# Patient Record
Sex: FEMALE | Race: BLACK OR AFRICAN AMERICAN | NOT HISPANIC OR LATINO | Employment: FULL TIME | URBAN - METROPOLITAN AREA
[De-identification: names, ages, dates, MRNs, and addresses within clinical notes are randomized per-mention and may not be internally consistent; named-entity substitution may affect disease eponyms.]

---

## 2017-06-19 ENCOUNTER — OFFICE VISIT (OUTPATIENT)
Dept: INTERNAL MEDICINE | Facility: CLINIC | Age: 52
End: 2017-06-19
Payer: COMMERCIAL

## 2017-06-19 VITALS
SYSTOLIC BLOOD PRESSURE: 100 MMHG | WEIGHT: 289.69 LBS | HEART RATE: 83 BPM | OXYGEN SATURATION: 97 % | DIASTOLIC BLOOD PRESSURE: 60 MMHG | BODY MASS INDEX: 40.56 KG/M2 | TEMPERATURE: 97 F | HEIGHT: 71 IN

## 2017-06-19 DIAGNOSIS — J01.90 ACUTE SINUSITIS, RECURRENCE NOT SPECIFIED, UNSPECIFIED LOCATION: Primary | ICD-10-CM

## 2017-06-19 PROCEDURE — 99213 OFFICE O/P EST LOW 20 MIN: CPT | Mod: S$GLB,,, | Performed by: PHYSICIAN ASSISTANT

## 2017-06-19 PROCEDURE — 99999 PR PBB SHADOW E&M-EST. PATIENT-LVL III: CPT | Mod: PBBFAC,,, | Performed by: PHYSICIAN ASSISTANT

## 2017-06-19 RX ORDER — DOXYCYCLINE 100 MG/1
100 CAPSULE ORAL 2 TIMES DAILY
Qty: 14 CAPSULE | Refills: 0 | Status: SHIPPED | OUTPATIENT
Start: 2017-06-19 | End: 2017-06-26

## 2017-06-19 NOTE — PROGRESS NOTES
"  Subjective:      Patient ID: Pebbles Gonzalez is a 52 y.o. female.    Chief Complaint: Sore Throat    URI    This is a new problem. Episode onset: 2 weeks. The problem has been gradually worsening. There has been no fever. Associated symptoms include ear pain, headaches, rhinorrhea and a sore throat. Pertinent negatives include no abdominal pain, chest pain, congestion, coughing, diarrhea, dysuria, joint pain, joint swelling, nausea, neck pain, plugged ear sensation, rash, sinus pain, sneezing, swollen glands, vomiting or wheezing. Treatments tried: flonase. The treatment provided no relief.       Review of Systems   Constitutional: Positive for fatigue. Negative for activity change, appetite change, chills, diaphoresis, fever and unexpected weight change.   HENT: Positive for ear pain, postnasal drip, rhinorrhea and sore throat. Negative for congestion, dental problem, sneezing and trouble swallowing.    Eyes: Positive for itching. Negative for photophobia, pain, discharge and redness.   Respiratory: Positive for shortness of breath. Negative for cough, chest tightness and wheezing.    Cardiovascular: Negative for chest pain.   Gastrointestinal: Negative for abdominal pain, diarrhea, nausea and vomiting.   Genitourinary: Negative for dysuria.   Musculoskeletal: Negative for joint pain and neck pain.   Skin: Negative for rash.   Neurological: Positive for headaches.     Objective:   /60   Pulse 83   Temp 96.8 °F (36 °C) (Tympanic)   Ht 5' 11" (1.803 m)   Wt 131.4 kg (289 lb 11 oz)   SpO2 97%   BMI 40.40 kg/m²     Physical Exam   Constitutional: She is oriented to person, place, and time. She appears well-developed and well-nourished.   HENT:   Head: Normocephalic and atraumatic.   Right Ear: Hearing, tympanic membrane, external ear and ear canal normal. No tenderness.   Left Ear: Hearing, tympanic membrane, external ear and ear canal normal. No tenderness.   Nose: Mucosal edema and rhinorrhea " present. No sinus tenderness. Right sinus exhibits no maxillary sinus tenderness and no frontal sinus tenderness. Left sinus exhibits no maxillary sinus tenderness and no frontal sinus tenderness.   Mouth/Throat: Uvula is midline and mucous membranes are normal. No oral lesions. Normal dentition. No dental abscesses, uvula swelling or dental caries. Oropharyngeal exudate and posterior oropharyngeal erythema present. No posterior oropharyngeal edema or tonsillar abscesses. No tonsillar exudate.   Eyes: Conjunctivae and EOM are normal. Pupils are equal, round, and reactive to light. Right eye exhibits no discharge. Left eye exhibits no discharge.   Neck: Normal range of motion. Neck supple. Muscular tenderness present. No no neck rigidity.   Cardiovascular: Normal rate, regular rhythm and normal heart sounds.  Exam reveals no gallop and no friction rub.    No murmur heard.  Pulmonary/Chest: Effort normal and breath sounds normal. No respiratory distress. She has no wheezes. She has no rales.   Lymphadenopathy:     She has no cervical adenopathy.   Neurological: She is alert and oriented to person, place, and time.   Skin: Skin is warm. No rash noted. She is not diaphoretic. No erythema. No pallor.   Psychiatric: She has a normal mood and affect. Her behavior is normal. Judgment and thought content normal.   Nursing note and vitals reviewed.      Assessment:     1. Acute sinusitis, recurrence not specified, unspecified location      Plan:   Acute sinusitis, recurrence not specified, unspecified location  -     doxycycline (MONODOX) 100 MG capsule; Take 1 capsule (100 mg total) by mouth 2 (two) times daily. Do not take with milk or yogurt  Dispense: 14 capsule; Refill: 0    -dayquil/nyquil. Cepacol cough drops.  Gargles  -Educational handout on over-the-counter medications and at-home conservative care, pertinent to the patients diagnosis today, was handed to the patient and discussed in detail.    Return if symptoms  worsen or fail to improve.

## 2017-06-19 NOTE — PATIENT INSTRUCTIONS
-Over-the-counter supportive tx for colds and cough:   -start cepacol max sore throat drops (over-the-counter)  - refrain from smoking, drink plenty of fluids, hot tea with honey, rest, take medications as prescribed, and use a humidifier or steam in the bathroom.   -Shower in the morning and evening to wash away any allergens and help reduce the production of mucus.   -Try OTC saline nasal spray or nedi-pot using warm filtered water.   -Take tylenol or Advil for fever, sore throat, and muscle aches.  -warm salt water gargles or Listerine gargles for sore throat frequently throughout the day.  - Try OTC Mucinex (guafenesin) for cough with thick mucous.   - Zinc lozenge, Emergen-C, or Airborne,  to boost immune system.     -No more than 10 in 24 hours.  -Phenylephrine/pseudophedrine or anything labeled with a D after it is for congestion.   Avoid decongestants if diagnosed with hypertension or arrhythmias. To avoid  rebound congestion, refrain from taking decongestant for longer than 5 day.    -DM is for dextromethorphan. This is a cough suppressant.   -For prevention,extremely important to quit smoking, get annual influenza vaccines, reduce exposure to air pollution, and to frequently wash hands to avoid spread of infection.

## 2017-06-20 ENCOUNTER — PATIENT MESSAGE (OUTPATIENT)
Dept: OBSTETRICS AND GYNECOLOGY | Facility: CLINIC | Age: 52
End: 2017-06-20

## 2018-01-08 ENCOUNTER — OFFICE VISIT (OUTPATIENT)
Dept: INTERNAL MEDICINE | Facility: CLINIC | Age: 53
End: 2018-01-08
Payer: COMMERCIAL

## 2018-01-08 VITALS
SYSTOLIC BLOOD PRESSURE: 106 MMHG | TEMPERATURE: 98 F | WEIGHT: 293 LBS | OXYGEN SATURATION: 98 % | DIASTOLIC BLOOD PRESSURE: 68 MMHG | BODY MASS INDEX: 41.02 KG/M2 | HEIGHT: 71 IN | HEART RATE: 94 BPM

## 2018-01-08 DIAGNOSIS — Z12.31 ENCOUNTER FOR SCREENING MAMMOGRAM FOR MALIGNANT NEOPLASM OF BREAST: ICD-10-CM

## 2018-01-08 DIAGNOSIS — J45.20 MILD INTERMITTENT ASTHMA WITHOUT COMPLICATION: ICD-10-CM

## 2018-01-08 DIAGNOSIS — J32.9 SINUSITIS, UNSPECIFIED CHRONICITY, UNSPECIFIED LOCATION: Primary | ICD-10-CM

## 2018-01-08 PROCEDURE — 99999 PR PBB SHADOW E&M-EST. PATIENT-LVL III: CPT | Mod: PBBFAC,,, | Performed by: FAMILY MEDICINE

## 2018-01-08 PROCEDURE — 99214 OFFICE O/P EST MOD 30 MIN: CPT | Mod: S$GLB,,, | Performed by: FAMILY MEDICINE

## 2018-01-08 RX ORDER — CYCLOSPORINE 0.5 MG/ML
1 EMULSION OPHTHALMIC 2 TIMES DAILY
COMMUNITY
Start: 2017-12-22

## 2018-01-08 RX ORDER — LINACLOTIDE 145 UG/1
1 CAPSULE, GELATIN COATED ORAL DAILY
Refills: 1 | COMMUNITY
Start: 2017-12-30 | End: 2019-03-05 | Stop reason: SDUPTHER

## 2018-01-08 RX ORDER — PANTOPRAZOLE SODIUM 40 MG/1
40 TABLET, DELAYED RELEASE ORAL DAILY
COMMUNITY
Start: 2017-11-28 | End: 2019-03-05 | Stop reason: SDUPTHER

## 2018-01-08 RX ORDER — ALBUTEROL SULFATE 90 UG/1
2 AEROSOL, METERED RESPIRATORY (INHALATION) EVERY 6 HOURS PRN
Qty: 1 EACH | Refills: 11 | Status: SHIPPED | OUTPATIENT
Start: 2018-01-08 | End: 2021-02-24

## 2018-01-08 RX ORDER — FLUORIDE (SODIUM) 1.1 %
PASTE (ML) DENTAL
Refills: 1 | COMMUNITY
Start: 2017-11-16

## 2018-01-08 RX ORDER — FLUCONAZOLE 150 MG/1
150 TABLET ORAL ONCE
Qty: 1 TABLET | Refills: 1 | Status: SHIPPED | OUTPATIENT
Start: 2018-01-08 | End: 2018-01-08

## 2018-01-08 RX ORDER — DOXYCYCLINE 100 MG/1
100 CAPSULE ORAL 2 TIMES DAILY
Qty: 20 CAPSULE | Refills: 0 | Status: SHIPPED | OUTPATIENT
Start: 2018-01-08 | End: 2018-01-18

## 2018-01-08 RX ORDER — LINACLOTIDE 72 UG/1
1 CAPSULE, GELATIN COATED ORAL
COMMUNITY
Start: 2018-01-04 | End: 2018-02-08

## 2018-01-08 RX ORDER — FLUTICASONE PROPIONATE 50 MCG
1 SPRAY, SUSPENSION (ML) NASAL
COMMUNITY

## 2018-01-08 NOTE — PROGRESS NOTES
Subjective:       Patient ID: Pebbles Gonzalez is a 52 y.o. female.    Chief Complaint: Multiple issues see below    HPIone month ago cold sympt gradually resolved but returned w facial pressure, yellow prod cough and occas sob. Hx asthma no need for med long term  gerd on ppi sees gi  Past Medical History:   Diagnosis Date    Allergy     seasonal    Asthma     adult onset    GERD (gastroesophageal reflux disease)     History of mammogram 12/30/2016     Past Surgical History:   Procedure Laterality Date    CHOLECYSTECTOMY      TOTAL ABDOMINAL HYSTERECTOMY  2000     Family History   Problem Relation Age of Onset    Prostate cancer Father      Social History     Social History    Marital status:      Spouse name: N/A    Number of children: N/A    Years of education: N/A     Occupational History     Other     Social History Main Topics    Smoking status: Never Smoker    Smokeless tobacco: Never Used    Alcohol use No    Drug use: No    Sexual activity: Yes     Partners: Male      Comment: hyst     Other Topics Concern    None     Social History Narrative    None       Review of Systems  Cardiovascular: no chest pain  Chest: no shortness of breath  Abd: no abd pain  Remainder review of systems negative    Objective:      Physical Exam   Constitutional: She is oriented to person, place, and time. She appears well-developed and well-nourished. No distress.   HENT:   Head: Atraumatic.   Right Ear: External ear normal.   Left Ear: External ear normal.   Nose: Nose normal.   Mouth/Throat: Oropharynx is clear and moist. No oropharyngeal exudate.   Nl tms   Eyes: Conjunctivae and EOM are normal. Pupils are equal, round, and reactive to light. No scleral icterus.   Neck: Normal range of motion. Neck supple. No thyromegaly present.   Cardiovascular: Normal rate, regular rhythm and normal heart sounds.    No murmur heard.  Pulmonary/Chest: Effort normal and breath sounds normal. No respiratory distress.  She has no wheezes. She has no rales.   Lymphadenopathy:     She has no cervical adenopathy.   Neurological: She is alert and oriented to person, place, and time. No cranial nerve deficit. She exhibits normal muscle tone. Coordination normal.   Skin: Skin is warm. No rash noted. No erythema. No pallor.   Psychiatric: She has a normal mood and affect. Her behavior is normal. Judgment and thought content normal.   Nursing note and vitals reviewed.      Assessment:       1. Sinusitis, unspecified chronicity, unspecified location    2. Encounter for screening mammogram for malignant neoplasm of breast    3. Mild intermittent asthma without complication        Plan:       *f/u for physical  Sinusitis, unspecified chronicity, unspecified location    Encounter for screening mammogram for malignant neoplasm of breast  -     Mammo Digital Screening Bilat with CAD; Future; Expected date: 01/08/2018    Mild intermittent asthma without complication    Other orders  -     albuterol 90 mcg/actuation inhaler; Inhale 2 puffs into the lungs every 6 (six) hours as needed for Wheezing.  Dispense: 1 each; Refill: 11  -     doxycycline (VIBRAMYCIN) 100 MG Cap; Take 1 capsule (100 mg total) by mouth 2 (two) times daily.  Dispense: 20 capsule; Refill: 0  -     fluconazole (DIFLUCAN) 150 MG Tab; Take 1 tablet (150 mg total) by mouth once.  Dispense: 1 tablet; Refill: 1    **

## 2018-02-08 ENCOUNTER — OFFICE VISIT (OUTPATIENT)
Dept: INTERNAL MEDICINE | Facility: CLINIC | Age: 53
End: 2018-02-08
Payer: COMMERCIAL

## 2018-02-08 ENCOUNTER — HOSPITAL ENCOUNTER (OUTPATIENT)
Dept: RADIOLOGY | Facility: HOSPITAL | Age: 53
Discharge: HOME | End: 2018-02-08
Attending: FAMILY MEDICINE
Payer: COMMERCIAL

## 2018-02-08 VITALS
SYSTOLIC BLOOD PRESSURE: 126 MMHG | WEIGHT: 293 LBS | HEIGHT: 70 IN | TEMPERATURE: 97 F | DIASTOLIC BLOOD PRESSURE: 68 MMHG | HEART RATE: 87 BPM | OXYGEN SATURATION: 97 % | BODY MASS INDEX: 41.95 KG/M2

## 2018-02-08 VITALS — WEIGHT: 293 LBS | BODY MASS INDEX: 41.02 KG/M2 | HEIGHT: 71 IN

## 2018-02-08 DIAGNOSIS — Z00.00 ROUTINE HEALTH MAINTENANCE: Primary | ICD-10-CM

## 2018-02-08 DIAGNOSIS — Z12.31 ENCOUNTER FOR SCREENING MAMMOGRAM FOR MALIGNANT NEOPLASM OF BREAST: ICD-10-CM

## 2018-02-08 PROCEDURE — 99396 PREV VISIT EST AGE 40-64: CPT | Mod: 25,S$GLB,, | Performed by: FAMILY MEDICINE

## 2018-02-08 PROCEDURE — 90686 IIV4 VACC NO PRSV 0.5 ML IM: CPT | Mod: S$GLB,,, | Performed by: FAMILY MEDICINE

## 2018-02-08 PROCEDURE — 90732 PPSV23 VACC 2 YRS+ SUBQ/IM: CPT | Mod: S$GLB,,, | Performed by: FAMILY MEDICINE

## 2018-02-08 PROCEDURE — 99214 OFFICE O/P EST MOD 30 MIN: CPT | Mod: PO | Performed by: FAMILY MEDICINE

## 2018-02-08 PROCEDURE — 90472 IMMUNIZATION ADMIN EACH ADD: CPT | Mod: S$GLB,,, | Performed by: FAMILY MEDICINE

## 2018-02-08 PROCEDURE — 99999 PR PBB SHADOW E&M-EST. PATIENT-LVL IV: CPT | Mod: PBBFAC,,, | Performed by: FAMILY MEDICINE

## 2018-02-08 PROCEDURE — 90471 IMMUNIZATION ADMIN: CPT | Mod: S$GLB,,, | Performed by: FAMILY MEDICINE

## 2018-02-08 PROCEDURE — 77067 SCR MAMMO BI INCL CAD: CPT | Mod: 26,,, | Performed by: RADIOLOGY

## 2018-02-08 PROCEDURE — 77067 SCR MAMMO BI INCL CAD: CPT | Mod: TC,PO

## 2018-02-08 NOTE — PROGRESS NOTES
Subjective:       Patient ID: Pebbles Gonzalez is a 52 y.o. female.    Chief Complaint: here for physical examination and issues  below    HPIprev uri resolved  GERD asympt. Tolerating medication. No swallowing problems  Morbid obesity : we discussed need for  weight loss via health diet/portion control and if able then  Exercise;she is interestd in what sounds like obera with same dr who did stretta for reflux in past. She is working on exerc and health eating/whole 30 diet planned  Asthma asympt    Past Medical History:   Diagnosis Date    Allergy     seasonal    Asthma     adult onset    GERD (gastroesophageal reflux disease)     History of mammogram 12/30/2016     Past Surgical History:   Procedure Laterality Date    CHOLECYSTECTOMY      stretta      TOTAL ABDOMINAL HYSTERECTOMY  2000     Family History   Problem Relation Age of Onset    Prostate cancer Father     Ovarian cancer Maternal Aunt     Ovarian cancer Maternal Aunt     Ovarian cancer Maternal Aunt     Ovarian cancer Maternal Aunt      Social History     Social History    Marital status:      Spouse name: N/A    Number of children: N/A    Years of education: N/A     Occupational History     Other     Social History Main Topics    Smoking status: Never Smoker    Smokeless tobacco: Never Used    Alcohol use No    Drug use: No    Sexual activity: Yes     Partners: Male      Comment: hyst     Other Topics Concern    None     Social History Narrative    None         Review of Systems  Cardiovascular: no chest pain  Chest: no shortness of breath  Abd: no abd pain  Remainder review of systems negative    Objective:      Physical Exam   Constitutional: She is oriented to person, place, and time. She appears well-developed and well-nourished. No distress.   HENT:   Head: Atraumatic.   Right Ear: External ear normal.   Left Ear: External ear normal.   Nose: Nose normal.   Mouth/Throat: Oropharynx is clear and moist. No  oropharyngeal exudate.   bilat tms nl   Eyes: Conjunctivae and EOM are normal. Pupils are equal, round, and reactive to light. No scleral icterus.   Neck: Normal range of motion. Neck supple. No thyromegaly present.   Cardiovascular: Normal rate, regular rhythm and normal heart sounds.    No murmur heard.  Pulmonary/Chest: Effort normal and breath sounds normal. No respiratory distress. She has no wheezes. She has no rales.   Abdominal: Soft. Bowel sounds are normal. She exhibits no distension and no mass. There is no hepatosplenomegaly. There is no tenderness. There is no rebound and no guarding.   Musculoskeletal: Normal range of motion. She exhibits no edema or tenderness.   Lymphadenopathy:     She has no cervical adenopathy.   Neurological: She is alert and oriented to person, place, and time. No cranial nerve deficit. She exhibits normal muscle tone. Coordination normal.   Skin: Skin is warm. No rash noted. No erythema. No pallor.   Psychiatric: She has a normal mood and affect. Her behavior is normal. Judgment and thought content normal.   Nursing note and vitals reviewed.      Assessment:       1. Routine health maintenance      morbid obesity  asthma  Plan:       *fasting lab [place]  Lab 12 months and follow up after  Routine health maintenance  -     Basic metabolic panel; Future; Expected date: 02/08/2018  -     Lipid panel; Future; Expected date: 02/08/2018  -     Basic metabolic panel; Future; Expected date: 02/08/2019  -     Lipid panel; Future; Expected date: 02/08/2019    Other orders  -     (In Office Administered) Pneumococcal Polysaccharide Vaccine (23 Valent) (SQ/IM)    **  Two vaccines from a pharmacy when ready: tetanus/whooping cough vaccine and   Shingrix new shingles vaccine  via a pharmacy

## 2018-02-08 NOTE — PATIENT INSTRUCTIONS
Two vaccines from a pharmacy when ready: tetanus/whooping cough vaccine and   Shingrix new shingles vaccine  via a pharmacy

## 2019-01-14 ENCOUNTER — TELEPHONE (OUTPATIENT)
Dept: ORTHOPEDICS | Facility: CLINIC | Age: 54
End: 2019-01-14

## 2019-01-14 NOTE — TELEPHONE ENCOUNTER
Called the patient about their appointment on 1/15/19 at 8:00 with Nichole Myers. Informed the patient that we would have to reschedule their appointment for another day due to the fact that she was scheduled incorrectly. Patient asked if we had any openings today informed them that we did not have any openings today because we are still moving to the new location. Patient asked if we had anything at the [place] location. I informed them that the [place] location didn't have any openings today. Patient states that they would like to cancel this appointment and will go somewhere else. verbalized understanding.FP

## 2019-02-07 ENCOUNTER — HOSPITAL ENCOUNTER (OUTPATIENT)
Dept: RADIOLOGY | Facility: HOSPITAL | Age: 54
Discharge: HOME | End: 2019-02-07
Attending: PHYSICIAN ASSISTANT
Payer: COMMERCIAL

## 2019-02-07 ENCOUNTER — CLINICAL SUPPORT (OUTPATIENT)
Dept: CARDIOLOGY | Facility: CLINIC | Age: 54
End: 2019-02-07
Payer: COMMERCIAL

## 2019-02-07 ENCOUNTER — OFFICE VISIT (OUTPATIENT)
Dept: INTERNAL MEDICINE | Facility: CLINIC | Age: 54
End: 2019-02-07
Payer: COMMERCIAL

## 2019-02-07 VITALS
TEMPERATURE: 98 F | HEIGHT: 70 IN | DIASTOLIC BLOOD PRESSURE: 82 MMHG | BODY MASS INDEX: 41.95 KG/M2 | HEART RATE: 76 BPM | OXYGEN SATURATION: 96 % | WEIGHT: 293 LBS | SYSTOLIC BLOOD PRESSURE: 118 MMHG

## 2019-02-07 DIAGNOSIS — Z01.818 PREOP EXAM FOR INTERNAL MEDICINE: ICD-10-CM

## 2019-02-07 DIAGNOSIS — R82.90 ABNORMAL URINALYSIS: ICD-10-CM

## 2019-02-07 DIAGNOSIS — J45.20 MILD INTERMITTENT ASTHMA WITHOUT COMPLICATION: ICD-10-CM

## 2019-02-07 DIAGNOSIS — Z01.818 PREOP EXAM FOR INTERNAL MEDICINE: Primary | ICD-10-CM

## 2019-02-07 DIAGNOSIS — S83.241A ACUTE MEDIAL MENISCUS TEAR OF RIGHT KNEE, INITIAL ENCOUNTER: ICD-10-CM

## 2019-02-07 DIAGNOSIS — S83.241A ACUTE MEDIAL MENISCUS TEAR OF RIGHT KNEE, INITIAL ENCOUNTER: Primary | ICD-10-CM

## 2019-02-07 DIAGNOSIS — Z29.9 PREVENTIVE MEASURE: ICD-10-CM

## 2019-02-07 PROCEDURE — 93000 ELECTROCARDIOGRAM COMPLETE: CPT | Mod: S$GLB,,, | Performed by: INTERNAL MEDICINE

## 2019-02-07 PROCEDURE — 99214 PR OFFICE/OUTPT VISIT, EST, LEVL IV, 30-39 MIN: ICD-10-PCS | Mod: S$GLB,,, | Performed by: PHYSICIAN ASSISTANT

## 2019-02-07 PROCEDURE — 71046 X-RAY EXAM CHEST 2 VIEWS: CPT | Mod: TC

## 2019-02-07 PROCEDURE — 93000 EKG 12-LEAD: ICD-10-PCS | Mod: S$GLB,,, | Performed by: INTERNAL MEDICINE

## 2019-02-07 PROCEDURE — 3008F BODY MASS INDEX DOCD: CPT | Mod: CPTII,S$GLB,, | Performed by: PHYSICIAN ASSISTANT

## 2019-02-07 PROCEDURE — 71046 X-RAY EXAM CHEST 2 VIEWS: CPT | Mod: 26,,, | Performed by: RADIOLOGY

## 2019-02-07 PROCEDURE — 99214 OFFICE O/P EST MOD 30 MIN: CPT | Mod: S$GLB,,, | Performed by: PHYSICIAN ASSISTANT

## 2019-02-07 PROCEDURE — 99999 PR PBB SHADOW E&M-EST. PATIENT-LVL III: CPT | Mod: PBBFAC,,, | Performed by: PHYSICIAN ASSISTANT

## 2019-02-07 PROCEDURE — 3008F PR BODY MASS INDEX (BMI) DOCUMENTED: ICD-10-PCS | Mod: CPTII,S$GLB,, | Performed by: PHYSICIAN ASSISTANT

## 2019-02-07 PROCEDURE — 99999 PR PBB SHADOW E&M-EST. PATIENT-LVL III: ICD-10-PCS | Mod: PBBFAC,,, | Performed by: PHYSICIAN ASSISTANT

## 2019-02-07 PROCEDURE — 71046 XR CHEST PA AND LATERAL: ICD-10-PCS | Mod: 26,,, | Performed by: RADIOLOGY

## 2019-02-07 NOTE — PROGRESS NOTES
"Subjective:       Patient ID: Pebbles Gonzalez is a 53 y.o. female.    Chief Complaint: Pre-op Exam    53 year old female presents to clinic for pre-op exam. She reports being scheduled for R knee meniscus repair by Dr. Connell ([facility]) 2/12/19. She reports feeling well today and reports no fever, chills, sxs of infection, CP, SOB, exertional sxs, or other medical complaints. She reports having itching with anesthesia.     PCP: Dr. Adorno    Patient Active Problem List:     Mild intermittent asthma without complication      Review of Systems   Constitutional: Negative for chills and fever.   HENT: Negative for congestion, rhinorrhea and sore throat.    Respiratory: Negative for cough and shortness of breath.    Cardiovascular: Negative for chest pain, palpitations and leg swelling.   Gastrointestinal: Negative for abdominal pain, diarrhea, nausea and vomiting.   Genitourinary: Negative for dysuria, frequency, hematuria and urgency.   Skin: Negative for rash.   Neurological: Negative for dizziness, weakness, numbness and headaches.   Psychiatric/Behavioral: Negative for confusion.       Objective:       Vitals:    02/07/19 0854   BP: 118/82   BP Location: Right arm   Patient Position: Sitting   BP Method: Large (Manual)   Pulse: 76   Temp: 97.6 °F (36.4 °C)   TempSrc: Tympanic   SpO2: 96%   Weight: (!) 136.2 kg (300 lb 4.3 oz)   Height: 5' 10" (1.778 m)     Physical Exam   Constitutional: She is oriented to person, place, and time. She appears well-developed and well-nourished. No distress.   HENT:   Head: Normocephalic and atraumatic.   Mouth/Throat: Oropharynx is clear and moist. No oropharyngeal exudate.   Eyes: EOM are normal. No scleral icterus.   Neck: Neck supple.   Cardiovascular: Normal rate and regular rhythm.   Pulmonary/Chest: Effort normal and breath sounds normal. No stridor. No respiratory distress. She has no decreased breath sounds. She has no wheezes. She has no rhonchi. She has no " rales.   Musculoskeletal: Normal range of motion. She exhibits no edema.   Neurological: She is alert and oriented to person, place, and time. No cranial nerve deficit.   Skin: Skin is warm and dry. No rash noted.   Psychiatric: She has a normal mood and affect. Her speech is normal and behavior is normal. Thought content normal.       Assessment:       1. Acute medial meniscus tear of right knee, initial encounter    2. Preop exam for internal medicine    3. Preventive measure    4. Mild intermittent asthma without complication        Plan:         Pebbles was seen today for pre-op exam.    Diagnoses and all orders for this visit:    Acute medial meniscus tear of right knee, initial encounter; Preop exam for internal medicine  -     CBC auto differential; Future  -     Comprehensive metabolic panel; Future  -     Protime-INR; Future  -     APTT; Future  -     Urinalysis; Future  -     Urinalysis Microscopic; Future  -     X-Ray Chest PA And Lateral; Future  -     EKG 12-lead; Future  Pre-op testing (as above) today with result review following. Will complete pre-op clearance after review of pending results. Pt to inform surgeon of any past adverse reactions to anesthesia.    Preventive measure  -     Lipid panel; Future  Pt reports missing her lipid labs ordered by PCP - will include lipid panel in labs today.    Mild intermittent asthma without complication  Currently asymptomatic.    F/u with PCP Dr. Adorno as recommended for health management.    Addendum 2/11/19:  Pre-op test results reviewed (as below). aPTT slightly elevated and microscopic hematuria present - will make note to surgeon. Pt needs to f/u with PCP for evaluation of hematuria and abnormal aPTT. Pt is otherwise cleared for upcoming surgery from general medical standpoint.   Component      Latest Ref Rng & Units 2/7/2019   WBC      3.90 - 12.70 K/uL 7.64   RBC      4.00 - 5.40 M/uL 4.65   Hemoglobin      12.0 - 16.0 g/dL 13.1   Hematocrit      37.0 -  48.5 % 42.2   MCV      82 - 98 fL 91   MCH      27.0 - 31.0 pg 28.2   MCHC      32.0 - 36.0 g/dL 31.0 (L)   RDW      11.5 - 14.5 % 16.1 (H)   Platelets      150 - 350 K/uL 403 (H)   MPV      9.2 - 12.9 fL 10.8   Immature Granulocytes      0.0 - 0.5 % 0.3   Gran # (ANC)      1.8 - 7.7 K/uL 3.5   Immature Grans (Abs)      0.00 - 0.04 K/uL 0.02   Lymph #      1.0 - 4.8 K/uL 3.4   Mono #      0.3 - 1.0 K/uL 0.5   Eos #      0.0 - 0.5 K/uL 0.2   Baso #      0.00 - 0.20 K/uL 0.05   nRBC      0 /100 WBC 0   Gran%      38.0 - 73.0 % 45.1   Lymph%      18.0 - 48.0 % 44.4   Mono%      4.0 - 15.0 % 6.9   Eosinophil%      0.0 - 8.0 % 2.6   Basophil%      0.0 - 1.9 % 0.7   Differential Method       Automated   Sodium      136 - 145 mmol/L 142   Potassium      3.5 - 5.1 mmol/L 3.9   Chloride      95 - 110 mmol/L 107   CO2      23 - 29 mmol/L 25   Glucose      70 - 110 mg/dL 92   BUN, Bld      6 - 20 mg/dL 8   Creatinine      0.5 - 1.4 mg/dL 0.8   Calcium      8.7 - 10.5 mg/dL 9.4   Total Protein      6.0 - 8.4 g/dL 7.8   Albumin      3.5 - 5.2 g/dL 3.5   Total Bilirubin      0.1 - 1.0 mg/dL 0.4   Alkaline Phosphatase      55 - 135 U/L 126   AST      10 - 40 U/L 15   ALT      10 - 44 U/L 12   Anion Gap      8 - 16 mmol/L 10   eGFR if African American      >60 mL/min/1.73 m:2 >60.0   eGFR if non African American      >60 mL/min/1.73 m:2 >60.0   Specimen UA       Urine, Clean Catch   Color, UA      Yellow, Straw, Susannah Yellow   Appearance, UA      Clear Hazy (A)   pH, UA      5.0 - 8.0 7.0   Specific Gravity, UA      1.005 - 1.030 1.020   Protein, UA      Negative Negative   Glucose, UA      Negative Negative   Ketones, UA      Negative Negative   Bilirubin (UA)      Negative Negative   Occult Blood UA      Negative Trace (A)   Nitrite, UA      Negative Negative   Leukocytes, UA      Negative Negative   Cholesterol      120 - 199 mg/dL 222 (H)   Triglycerides      30 - 150 mg/dL 107   HDL      40 - 75 mg/dL 48   LDL Cholesterol       "63.0 - 159.0 mg/dL 152.6   HDL/Chol Ratio      20.0 - 50.0 % 21.6   Total Cholesterol/HDL Ratio      2.0 - 5.0 4.6   Non-HDL Cholesterol      mg/dL 174   RBC, UA      0 - 4 /hpf 8 (H)   Bacteria, UA      None-Occ /hpf Few (A)   Squam Epithel, UA      /hpf 25   Microscopic Comment       SEE COMMENT   Protime      9.0 - 12.5 sec 10.9   Coumadin Monitoring INR      0.8 - 1.2 1.0   aPTT      21.0 - 32.0 sec 32.8 (H)     CXR 2/7/19: "EXAMINATION: XR CHEST PA AND LATERAL CLINICAL HISTORY: Encounter for other preprocedural examination TECHNIQUE: PA and lateral views of the chest were performed. COMPARISON: None FINDINGS: Cardiac silhouette and mediastinal contours are normal.  Lungs are clear. Osseous structures are intact. IMPRESSION: No acute cardiopulmonary process."    EKG 2/7/19: "Normal sinus rhythm. Normal ECG. No previous ECGs available. Confirmed by SUMI HARLEY MD ([id number]) on 2/7/2019 6:54:09 PM"    "

## 2019-03-05 ENCOUNTER — OFFICE VISIT (OUTPATIENT)
Dept: INTERNAL MEDICINE | Facility: CLINIC | Age: 54
End: 2019-03-05
Payer: COMMERCIAL

## 2019-03-05 VITALS
TEMPERATURE: 97 F | WEIGHT: 293 LBS | SYSTOLIC BLOOD PRESSURE: 116 MMHG | OXYGEN SATURATION: 97 % | BODY MASS INDEX: 41.02 KG/M2 | HEIGHT: 71 IN | HEART RATE: 81 BPM | DIASTOLIC BLOOD PRESSURE: 66 MMHG

## 2019-03-05 DIAGNOSIS — Z00.00 ROUTINE HEALTH MAINTENANCE: Primary | ICD-10-CM

## 2019-03-05 DIAGNOSIS — R31.9 HEMATURIA, UNSPECIFIED TYPE: ICD-10-CM

## 2019-03-05 DIAGNOSIS — E66.01 MORBID OBESITY: ICD-10-CM

## 2019-03-05 DIAGNOSIS — Z12.31 ENCOUNTER FOR SCREENING MAMMOGRAM FOR MALIGNANT NEOPLASM OF BREAST: ICD-10-CM

## 2019-03-05 PROCEDURE — 99999 PR PBB SHADOW E&M-EST. PATIENT-LVL IV: ICD-10-PCS | Mod: PBBFAC,,, | Performed by: FAMILY MEDICINE

## 2019-03-05 PROCEDURE — 99396 PREV VISIT EST AGE 40-64: CPT | Mod: S$GLB,,, | Performed by: FAMILY MEDICINE

## 2019-03-05 PROCEDURE — 99999 PR PBB SHADOW E&M-EST. PATIENT-LVL IV: CPT | Mod: PBBFAC,,, | Performed by: FAMILY MEDICINE

## 2019-03-05 PROCEDURE — 99396 PR PREVENTIVE VISIT,EST,40-64: ICD-10-PCS | Mod: S$GLB,,, | Performed by: FAMILY MEDICINE

## 2019-03-05 RX ORDER — ASPIRIN 325 MG
1 TABLET ORAL 2 TIMES DAILY
Refills: 0 | COMMUNITY
Start: 2019-02-12 | End: 2021-02-24

## 2019-03-05 RX ORDER — PANTOPRAZOLE SODIUM 40 MG/1
40 TABLET, DELAYED RELEASE ORAL DAILY
Qty: 90 TABLET | Refills: 3 | Status: SHIPPED | OUTPATIENT
Start: 2019-03-05 | End: 2020-04-13

## 2019-03-05 RX ORDER — LINACLOTIDE 145 UG/1
145 CAPSULE, GELATIN COATED ORAL DAILY
Qty: 90 CAPSULE | Refills: 3 | Status: SHIPPED | OUTPATIENT
Start: 2019-03-05 | End: 2020-04-13

## 2019-03-05 RX ORDER — OXYCODONE AND ACETAMINOPHEN 5; 325 MG/1; MG/1
1 TABLET ORAL
COMMUNITY
Start: 2019-02-12 | End: 2021-02-24

## 2019-03-05 NOTE — PATIENT INSTRUCTIONS
Lifestyle Changes to Control Cholesterol  You can control your cholesterol through diet, exercise, weight management, quitting smoking, stress management, and taking your medicines right. These things can also lower your risk for cardiovascular disease.    Eating healthy  Your healthcare provider will give you information on diet changes you may need to make. Your provider may recommend that you see a registered dietitian for help with diet changes. Changes may include:  · Cutting back on the amount of fat and cholesterol in your meals  · Eating less salt (sodium). This is especially important if you have high blood pressure.  · Eating more fresh vegetables and fruits  · Eating lean proteins such as fish, poultry, beans, and peas  · Eating less red meat and processed meats  · Using low-fat dairy products  · Using vegetable and nut oils in limited amounts  · Limiting how many sweets and processed foods like chips, cookies, and baked goods that you eat   · Limiting how many sugar-sweetened beverages you drink  · Limiting how often you eat out  Getting exercise  Regular exercise is a good way to help your body control cholesterol. Regular exercise can help in many ways. It can:  · Raise your good cholesterol  · Help lower your bad cholesterol  · Let blood flow better through your body  · Give more oxygen to your muscles and tissues  · Help you manage your weight  · Help your heart pump better  · Lower your blood pressure  Your healthcare provider may recommend that you get more physical activity if you haven't been active. Your provider may recommend that you get moderate to vigorous physical activity for at least 40 minutes each day. You should do this for at least 3 to 4 days each week. A few examples of moderate to vigorous activity are:  · Walking at a brisk pace. This is about 3 to 4 miles per hour.  · Jogging or running  · Swimming or water aerobics  · Hiking  · Dancing  · Martial arts  · Tennis  · Riding a  bicycle or stationary bike  · Dancing  Managing your weight  If you are overweight or obese, your healthcare provider will work with you to help you lose weight and lower your BMI (body mass index). Making diet changes and getting more physical activity can help. Changing your diet will help you lose weight more easily than adding exercise.  Quitting smoking  Smoking and other tobacco use can raise cholesterol and make it harder to control. Quitting is tough. But millions of people have given up tobacco for good. You can quit, too! Think about some of the reasons below to quit smoking. Do any of them make you think twice about your smoking habit?  Stop smoking because it:  · Keeps your cholesterol high, even if you make all the other changes youre supposed to  · Damages your body. It especially harms your heart, lungs, skin, and blood vessels.  · Makes you more likely to have a heart attack (acute myocardial infarction), stroke, or cancer  · Stains your teeth  · Makes your skin, clothes, and breath smell bad  · Costs a lot of money  Controlling stress   Learn ways to control stress. This will help you deal with stress in your home and work life. Controlling stress can greatly lower your risk of getting cardiovascular disease.  Making the most of medicines  Healthy eating and exercise are a good start to keeping your cholesterol down. But you may need some extra help from medicine. If your doctor prescribes medicine, be sure to take it exactly as directed. Remember:  · Tell your healthcare provider about all other medicines you take. This includes vitamins and herbs.  · Tell your healthcare provider if you have any side effects after starting to take a medicine. Examples of side effects to watch for include muscle aches, weakness, blurred vision, rust-colored urine, yellowing of eyes or skin (jaundice), and headache.  · Dont skip a dose or stop taking your medicine because you feel better or because  your cholesterol numbers go down. Never stop taking your medicine unless your healthcare provider has told you its OK.  · Ask your healthcare provider if you have any questions about your medicines.  High risk groups  Some people may need to take medicines called statins to control their cholesterol. This is in addition to eating a healthy diet and getting regular exercise.  Statins can help you stay healthy. They can also help prevent a heart attack or stroke. You may need to take a statin if you are in one of these groups:  · Adults who have had a heart attack or stroke. Or adults who have had peripheral vascular disease, a ministroke (transient ischemic attack), or stable or unstable angina. This group also includes people who have had a procedure to restore blood flow through a blocked artery. These procedures include percutaneous coronary intervention, angioplasty, stent, and open-heart bypass surgery.  · Adults who have diabetes. Or adults who are at higher risk of having a heart attack or stroke and have an LDL cholesterol level of 70 to 189 mg/dL  · Adults who are 21 years old or older and have an LDL cholesterol level of 190 mg/dL or higher.  If you are in a high-risk group, talk with your healthcare provider about your treatment goals. Make sure you understand why these goals are important, based on your own health history and your family history of heart disease or high cholesterol.  Make a plan to have regular cholesterol checks. You may need to fast before getting this test. Also ask your provider about any side effects your medicines may cause. Let your provider know about any side effects you have. You may need to take more than one medicine to reach the cholesterol goals that you and your provider decide on.  Date Last Reviewed: 10/1/2016  © 0482-1019 [employer]. [address] [place] [address], [place] [address]. All rights reserved. This information is not intended as a substitute for  professional medical care. Always follow your healthcare professional's instructions.        Cholesterol  Does this test have other names?  Total blood cholesterol, serum cholesterol  What is this test?  This test measures the amount of cholesterol in your blood. This helps your healthcare provider figure out your risk for heart disease.  Cholesterol is a substance found in all of your body's cells, where it plays an important role. But your body can have too much cholesterol if you eat the wrong types of foods. These include fried foods and foods with saturated or trans fats. Some health conditions can also make your cholesterol level too high.  If you have too much cholesterol in your blood, it can stick to the walls of the arteries in your heart. This can cause heart disease. Cholesterol can also stick to the walls of arteries elsewhere in your body. This can cause other artery diseases. The extra cholesterol can make your blood vessels narrower (atherosclerosis). This narrowing makes it harder to get enough blood through your blood vessels. If your heart muscles don't get enough blood, you may be at risk for a heart attack.  The American Heart Association recommends that all adults ages 20 and older have their cholesterol checked every 4 to 6 years.  Why do I need this test?  You may have this test as part of your regular health checkup. You may have this test done more often if you are at risk for heart disease or have other health problems caused by high cholesterol.  Here are some common reasons for the test:  · You have risk factors for heart disease. These include older age, obesity, family history of heart disease, high blood pressure, smoking, and diabetes.  · You have a condition that may be closely linked to high cholesterol. This includes diabetes, alcoholism, and thyroid, liver, or kidney disease.  · You eat a diet high in cholesterol and fats.  You may also have this test if you had high or  "borderline cholesterol on an earlier blood test. Your healthcare provider may want to check to see whether medicine, diet, exercise, and other lifestyle changes are helping lower your cholesterol.  What other tests might I have along with this test?  Your healthcare provider may also order other blood tests to find out your HDL ("good") cholesterol, LDL ("bad") cholesterol, and triglyceride levels. This combination of blood tests is called a lipoprotein profile or a lipid profile.  What do my test results mean?  Many things may affect your lab test results. These include the method each lab uses to do the test. Even if your test results are different from the normal value, you may not have a problem. To learn what the results mean for you, talk with your healthcare provider.  Total cholesterol is measured in milligrams per deciliter (mg/dL). This is what your cholesterol number may mean:  · Less than 200 mg/dL is a good number. Your risk of heart disease may be low.  · 200 mg/dL to 239 mg/dL is borderline high. You may be at some risk for heart disease.  · 240 mg/dL or higher means your cholesterol is high. Your risk for heart disease may be higher than that of a person with normal cholesterol.  Keep in mind that your risk for heart disease based on your total cholesterol greatly depends on your HDL and LDL cholesterol levels and other risk factors.  High cholesterol may be linked to these conditions:  · Inherited diseases that cause high cholesterol  · Gallbladder stones  · Kidney failure  · Cancer of the pancreas or prostate  · Low thyroid hormone  · Diabetes  · Obesity  Cholesterol levels below 140 mg/dL may happen with:  · Very healthy lifestyle and diet  · Severe liver disease  · High thyroid hormone  · Severe burns  · White blood cell cancers  · Poor nutrition  · Some types of anemia  · Short-term illness or infection  · Chronic lung disease  How is this test done?  The test requires a blood sample, which is " drawn through a needle from a vein in your arm.  Does this test pose any risks?  Taking a blood sample with a needle carries risks that include bleeding, infection, bruising, or feeling dizzy. When the needle pricks your arm, you may feel a slight stinging sensation or pain. Afterward, the site may be slightly sore.  What might affect my test results?  Your diet, age, alcohol use, and other lifestyle choices may affect your results. Many medicines may also affect your results. Pregnancy may also affect your results. Having a heart attack in the last 3 months will also affect your results.  How do I get ready for this test?  You should keep to your regular diet and avoid alcohol for at least 2 days before this test. You will be asked to not eat or drink anything but water for a certain amount of time before the test. This test is usually done in the morning after you fast overnight. If you take medicines in the morning, ask your healthcare provider whether you should take your pills.  In addition, be sure your healthcare provider knows about all medicines, herbs, vitamins, and supplements you are taking. This includes medicines that don't need a prescription and any illicit drugs you may use.  © 0935-2134 [employer]. [address] [place] [address], [place] [address]. All rights reserved. This information is not intended as a substitute for professional medical care. Always follow your healthcare professional's instructions.        Understanding Fat and Cholesterol  Too much cholesterol in your blood can lead to many problems such as blocked arteries. This can lead to heart attack and stroke. One of the best ways to manage heart and blood vessel disease is to lower your blood cholesterol. Planning meals that are low in saturated fat and cholesterol helps reduce the level of cholesterol in your blood. Below are eating tips to help lower your blood cholesterol levels.  Eat less fat  A healthy goal is to have less  than 25% to 35% of your daily calories come from fat. Instead of fats, eat more fruits, whole-grains, and vegetables. This also helps control your weight, and can even reduce your risk for some cancers. There are different kinds of fats in foods. Fats can be saturated, unsaturated, or trans fats. The best fats to choose are unsaturated fats. But fats are high in calories, so eat even unsaturated fats sparingly.  Limit foods high in saturated fats  Saturated fats come from animals and certain plants (such as coconut and palm). Eating too much saturated fat can raise your blood cholesterol levels and make your artery problems worse. Your goal is to eat less saturated fat. Below are some examples of foods that contain lots of saturated fat:  · Fatty cuts of meat (lamb, ham, beef)  · Many pastries, cakes, cookies, and candies  · Cream, ice cream, sour cream, cheese, and butter, and foods made with them  · Sauces made with butter or cream  · Salad dressings with saturated fats  · Foods that contain palm or coconut oil  Choose unsaturated fats  Unsaturated fats are usually liquid at room temperature. They are better choices for your heart than saturated fat. There are two types of unsaturated fats: polyunsaturated fat and monounsaturated fat. Aim to replace saturated fats with polyunsaturated or monounsaturated fats.  · Polyunsaturated fats are found in corn oil, safflower oil, sunflower oil, and other vegetable oils.  · Monounsaturated fats are found in olive oil, canola oil, and peanut oil. Some margarines and spreads are now made with these oils, too. Avocados are also high in monounsaturated fat.  Of all fats, monounsaturated fats are the least harmful to your heart.  Avoid trans fats  Like saturated fats, trans fats have been linked to heart disease. Even a small amount can harm your health. Trans fats are found in liquid oils that have been changed to be solid at room temperature. Margarine, which is often made from  vegetable oil, is one example. Vegetable shortening is another. Trans fats are often found in packaged goods. Check ingredients for the words hydrogenated or partially hydrogenated. They mean the foods contain trans fat.  What about triglycerides?  Triglycerides are a type of fat in your blood. Like cholesterol, high levels of triglycerides can lead to blocked arteries. High triglyceride levels can be reduced 20% to 50%  by limiting added sugars in your diet, susbstituting healthier fats for saturated and trans fats, getting more physical activity, and losing weight if your are overweight. You may also be advised to avoid or limi alcohol.    Reading food labels  Luckily, most foods now have labels giving you the facts about what youre eating. Reading food labels helps you make healthy choices. Look for the words highlighted below.  · Serving Size. This is the amount of food in 1 serving. If you eat larger portions, be sure to count more of everything: fat, calories, and cholesterol.  · Total Fat. Tells you how many grams (g) of fat are in 1 serving.  · Calories from Fat. This tells you the total number of calories from fat in 1 serving (there are 9 calories per gram of fat). Look for foods with the fewest calories from fat.  · Saturated Fat. Tells you how many grams (g) of saturated fat are in 1 serving.  · Trans Fat. Tells how many grams (g) of trans fat are in 1 serving.  · Cholesterol. Tells you how many milligrams (mg) of cholesterol are in 1 serving.  Date Last Reviewed: 5/11/2015  © 4457-0914 The "[facility] [employer]. [address] [place] [address], [place] [address]. All rights reserved. This information is not intended as a substitute for professional medical care. Always follow your healthcare professional's instructions.      Shingrix new shingles vaccine  via a pharmacy

## 2019-03-05 NOTE — PROGRESS NOTES
Subjective:       Patient ID: Pebbles Gonzalez is a . female.    Chief Complaint: here for physical examination and issues  below    HPIp  GERD . Tolerating medication. Had stretta procedure.stillsome sympt but better plans to f/u dr carreon  Morbid obesity : we discussed need for  weight loss via health diet/portion control and if able then  Exercise;  Asthma asympt    hyperchol cv risk 3%    Hematuria on preop lab. Looked contam specimen. Some bact. Had periop abx;asympt; no c/o back pain    rcovering from ortho surg. In PT    Due adacel. Hx local rxn latex. No anaphylaxis. lubna flu shots and td in past    Past Medical History:   Diagnosis Date    Allergy     seasonal    Asthma     adult onset    GERD (gastroesophageal reflux disease)     History of mammogram 12/30/2016    Hypercholesteremia      Past Surgical History:   Procedure Laterality Date    CHOLECYSTECTOMY      KNEE ARTHROSCOPY W/ PARTIAL MEDIAL MENISCECTOMY      stretta      TOTAL ABDOMINAL HYSTERECTOMY  2000     Family History   Problem Relation Age of Onset    Prostate cancer Father     Ovarian cancer Maternal Aunt     Ovarian cancer Maternal Aunt     Ovarian cancer Maternal Aunt     Ovarian cancer Maternal Aunt      Social History     Socioeconomic History    Marital status:      Spouse name: None    Number of children: None    Years of education: None    Highest education level: None   Social Needs    Financial resource strain: None    Food insecurity - worry: None    Food insecurity - inability: None    Transportation needs - medical: None    Transportation needs - non-medical: None   Occupational History     Employer: other   Tobacco Use    Smoking status: Never Smoker    Smokeless tobacco: Never Used   Substance and Sexual Activity    Alcohol use: No     Alcohol/week: 0.0 oz    Drug use: No    Sexual activity: Yes     Partners: Male     Comment: hyst   Other Topics Concern    None   Social History Narrative     None     Review of Systems  Cardiovascular: no chest pain  Chest: no shortness of breath  Abd: no abd pain  Remainder review of systems negative    Objective:      Physical Exam   Constitutional: She is oriented to person, place, and time. She appears well-developed and well-nourished. No distress.   HENT:   Head: Atraumatic.   Right Ear: External ear normal.   Left Ear: External ear normal.   Nose: Nose normal.   Mouth/Throat: Oropharynx is clear and moist. No oropharyngeal exudate.   bilat tms nl   Eyes: Conjunctivae and EOM are normal. Pupils are equal, round, and reactive to light. No scleral icterus.   Neck: Normal range of motion. Neck supple. No thyromegaly present.   Cardiovascular: Normal rate, regular rhythm and normal heart sounds.    No murmur heard.  Pulmonary/Chest: Effort normal and breath sounds normal. No respiratory distress. She has no wheezes. She has no rales.   Abdominal: Soft. Bowel sounds are normal. She exhibits no distension and no mass. There is no hepatosplenomegaly. There is no tenderness. There is no rebound and no guarding.   Musculoskeletal: Normal range of motion. She exhibits no edema or tenderness.   Lymphadenopathy:     She has no cervical adenopathy.   Neurological: She is alert and oriented to person, place, and time. No cranial nerve deficit. She exhibits normal muscle tone. Coordination normal.   Skin: Skin is warm. No rash noted. No erythema. No pallor.   Psychiatric: She has a normal mood and affect. Her behavior is normal. Judgment and thought content normal.   Nursing note and vitals reviewed.      Assessment:       1. Routine health maintenance      morbid obesity  Asthma  hyperchol  hematuria  Plan:     Low chol handout/diet  Wt loss exerc  Lab 12 months and follow up after  Routine health maintenance    Hematuria, unspecified type  -     Urinalysis; Future; Expected date: 04/05/2019;d/wd midstream     Encounter for screening mammogram for malignant neoplasm of  breast  -     Mammo Digital Screening Bilat; Future; Expected date: 03/05/2019    Morbid obesity    Other orders  -     pantoprazole (PROTONIX) 40 MG tablet; Take 1 tablet (40 mg total) by mouth once daily.  Dispense: 90 tablet; Refill: 3  -     LINZESS 145 mcg Cap capsule; Take 1 capsule (145 mcg total) by mouth once daily.  Dispense: 90 capsule; Refill: 3  -     DIPH,PERTUSS(ACEL),TET VAC(PF)(ADULT)(ADACEL)(TDaP)    Shingrix new shingles vaccine  via a pharmacy    mammo [facility]

## 2020-03-05 ENCOUNTER — OFFICE VISIT (OUTPATIENT)
Dept: INTERNAL MEDICINE | Facility: CLINIC | Age: 55
End: 2020-03-05
Payer: COMMERCIAL

## 2020-03-05 ENCOUNTER — LAB VISIT (OUTPATIENT)
Dept: LAB | Facility: HOSPITAL | Age: 55
End: 2020-03-05
Attending: FAMILY MEDICINE
Payer: COMMERCIAL

## 2020-03-05 ENCOUNTER — LAB VISIT (OUTPATIENT)
Dept: LAB | Facility: HOSPITAL | Age: 55
End: 2020-03-05
Payer: COMMERCIAL

## 2020-03-05 VITALS
OXYGEN SATURATION: 97 % | WEIGHT: 285.69 LBS | SYSTOLIC BLOOD PRESSURE: 122 MMHG | DIASTOLIC BLOOD PRESSURE: 80 MMHG | HEART RATE: 80 BPM | TEMPERATURE: 98 F | BODY MASS INDEX: 40 KG/M2 | HEIGHT: 71 IN

## 2020-03-05 DIAGNOSIS — J45.20 MILD INTERMITTENT ASTHMA WITHOUT COMPLICATION: ICD-10-CM

## 2020-03-05 DIAGNOSIS — G89.29 CHRONIC LOW BACK PAIN WITHOUT SCIATICA, UNSPECIFIED BACK PAIN LATERALITY: ICD-10-CM

## 2020-03-05 DIAGNOSIS — M54.50 CHRONIC LOW BACK PAIN WITHOUT SCIATICA, UNSPECIFIED BACK PAIN LATERALITY: ICD-10-CM

## 2020-03-05 DIAGNOSIS — Z00.00 ROUTINE HEALTH MAINTENANCE: ICD-10-CM

## 2020-03-05 DIAGNOSIS — E66.01 MORBID OBESITY: ICD-10-CM

## 2020-03-05 DIAGNOSIS — R31.9 HEMATURIA, UNSPECIFIED TYPE: ICD-10-CM

## 2020-03-05 DIAGNOSIS — Z00.00 ROUTINE HEALTH MAINTENANCE: Primary | ICD-10-CM

## 2020-03-05 DIAGNOSIS — Z12.31 ENCOUNTER FOR SCREENING MAMMOGRAM FOR MALIGNANT NEOPLASM OF BREAST: ICD-10-CM

## 2020-03-05 LAB
ALBUMIN SERPL BCP-MCNC: 3.7 G/DL (ref 3.5–5.2)
ALP SERPL-CCNC: 129 U/L (ref 55–135)
ALT SERPL W/O P-5'-P-CCNC: 10 U/L (ref 10–44)
ANION GAP SERPL CALC-SCNC: 9 MMOL/L (ref 8–16)
AST SERPL-CCNC: 14 U/L (ref 10–40)
BASOPHILS # BLD AUTO: 0.03 K/UL (ref 0–0.2)
BASOPHILS NFR BLD: 0.4 % (ref 0–1.9)
BILIRUB SERPL-MCNC: 0.4 MG/DL (ref 0.1–1)
BILIRUB UR QL STRIP: NEGATIVE
BUN SERPL-MCNC: 7 MG/DL (ref 6–20)
CALCIUM SERPL-MCNC: 9.5 MG/DL (ref 8.7–10.5)
CHLORIDE SERPL-SCNC: 107 MMOL/L (ref 95–110)
CHOLEST SERPL-MCNC: 237 MG/DL (ref 120–199)
CHOLEST/HDLC SERPL: 4.7 {RATIO} (ref 2–5)
CLARITY UR: CLEAR
CO2 SERPL-SCNC: 29 MMOL/L (ref 23–29)
COLOR UR: YELLOW
CREAT SERPL-MCNC: 0.8 MG/DL (ref 0.5–1.4)
DIFFERENTIAL METHOD: ABNORMAL
EOSINOPHIL # BLD AUTO: 0.1 K/UL (ref 0–0.5)
EOSINOPHIL NFR BLD: 1.4 % (ref 0–8)
ERYTHROCYTE [DISTWIDTH] IN BLOOD BY AUTOMATED COUNT: 16.3 % (ref 11.5–14.5)
EST. GFR  (AFRICAN AMERICAN): >60 ML/MIN/1.73 M^2
EST. GFR  (NON AFRICAN AMERICAN): >60 ML/MIN/1.73 M^2
GLUCOSE SERPL-MCNC: 89 MG/DL (ref 70–110)
GLUCOSE UR QL STRIP: NEGATIVE
HCT VFR BLD AUTO: 43.9 % (ref 37–48.5)
HDLC SERPL-MCNC: 50 MG/DL (ref 40–75)
HDLC SERPL: 21.1 % (ref 20–50)
HGB BLD-MCNC: 13.2 G/DL (ref 12–16)
HGB UR QL STRIP: NEGATIVE
IMM GRANULOCYTES # BLD AUTO: 0.04 K/UL (ref 0–0.04)
IMM GRANULOCYTES NFR BLD AUTO: 0.5 % (ref 0–0.5)
KETONES UR QL STRIP: ABNORMAL
LDLC SERPL CALC-MCNC: 167.4 MG/DL (ref 63–159)
LEUKOCYTE ESTERASE UR QL STRIP: NEGATIVE
LYMPHOCYTES # BLD AUTO: 3 K/UL (ref 1–4.8)
LYMPHOCYTES NFR BLD: 36.7 % (ref 18–48)
MCH RBC QN AUTO: 28.3 PG (ref 27–31)
MCHC RBC AUTO-ENTMCNC: 30.1 G/DL (ref 32–36)
MCV RBC AUTO: 94 FL (ref 82–98)
MONOCYTES # BLD AUTO: 0.5 K/UL (ref 0.3–1)
MONOCYTES NFR BLD: 6.4 % (ref 4–15)
NEUTROPHILS # BLD AUTO: 4.4 K/UL (ref 1.8–7.7)
NEUTROPHILS NFR BLD: 54.6 % (ref 38–73)
NITRITE UR QL STRIP: NEGATIVE
NONHDLC SERPL-MCNC: 187 MG/DL
NRBC BLD-RTO: 0 /100 WBC
PH UR STRIP: 7 [PH] (ref 5–8)
PLATELET # BLD AUTO: 414 K/UL (ref 150–350)
PMV BLD AUTO: 10.6 FL (ref 9.2–12.9)
POTASSIUM SERPL-SCNC: 3.9 MMOL/L (ref 3.5–5.1)
PROT SERPL-MCNC: 7.8 G/DL (ref 6–8.4)
PROT UR QL STRIP: NEGATIVE
RBC # BLD AUTO: 4.67 M/UL (ref 4–5.4)
SODIUM SERPL-SCNC: 145 MMOL/L (ref 136–145)
SP GR UR STRIP: 1.02 (ref 1–1.03)
TRIGL SERPL-MCNC: 98 MG/DL (ref 30–150)
TSH SERPL DL<=0.005 MIU/L-ACNC: 0.55 UIU/ML (ref 0.4–4)
URN SPEC COLLECT METH UR: ABNORMAL
WBC # BLD AUTO: 8.11 K/UL (ref 3.9–12.7)

## 2020-03-05 PROCEDURE — 90471 IMMUNIZATION ADMIN: CPT | Mod: S$GLB,,, | Performed by: FAMILY MEDICINE

## 2020-03-05 PROCEDURE — 84443 ASSAY THYROID STIM HORMONE: CPT

## 2020-03-05 PROCEDURE — 90686 FLU VACCINE (QUAD) GREATER THAN OR EQUAL TO 3YO PRESERVATIVE FREE IM: ICD-10-PCS | Mod: S$GLB,,, | Performed by: FAMILY MEDICINE

## 2020-03-05 PROCEDURE — 99396 PR PREVENTIVE VISIT,EST,40-64: ICD-10-PCS | Mod: 25,S$GLB,, | Performed by: FAMILY MEDICINE

## 2020-03-05 PROCEDURE — 36415 COLL VENOUS BLD VENIPUNCTURE: CPT

## 2020-03-05 PROCEDURE — 99999 PR PBB SHADOW E&M-EST. PATIENT-LVL V: ICD-10-PCS | Mod: PBBFAC,,, | Performed by: FAMILY MEDICINE

## 2020-03-05 PROCEDURE — 80061 LIPID PANEL: CPT

## 2020-03-05 PROCEDURE — 81003 URINALYSIS AUTO W/O SCOPE: CPT

## 2020-03-05 PROCEDURE — 90686 IIV4 VACC NO PRSV 0.5 ML IM: CPT | Mod: S$GLB,,, | Performed by: FAMILY MEDICINE

## 2020-03-05 PROCEDURE — 85025 COMPLETE CBC W/AUTO DIFF WBC: CPT

## 2020-03-05 PROCEDURE — 99999 PR PBB SHADOW E&M-EST. PATIENT-LVL V: CPT | Mod: PBBFAC,,, | Performed by: FAMILY MEDICINE

## 2020-03-05 PROCEDURE — 90471 FLU VACCINE (QUAD) GREATER THAN OR EQUAL TO 3YO PRESERVATIVE FREE IM: ICD-10-PCS | Mod: S$GLB,,, | Performed by: FAMILY MEDICINE

## 2020-03-05 PROCEDURE — 80053 COMPREHEN METABOLIC PANEL: CPT

## 2020-03-05 PROCEDURE — 99396 PREV VISIT EST AGE 40-64: CPT | Mod: 25,S$GLB,, | Performed by: FAMILY MEDICINE

## 2020-03-05 RX ORDER — MELOXICAM 15 MG/1
15 TABLET ORAL DAILY PRN
Qty: 30 TABLET | Refills: 1 | Status: SHIPPED | OUTPATIENT
Start: 2020-03-05 | End: 2021-02-24

## 2020-03-05 NOTE — PATIENT INSTRUCTIONS
Shingrix new shingles vaccine  via a pharmacy  meloxicam to replace naproxen  If no better 3-4 weeks  followup sooner if any worsening or change in symptoms or lack of resolution after PT complete

## 2020-03-05 NOTE — PROGRESS NOTES
Subjective:       Patient ID: Pebbles Gonzalez is a . female.    Chief Complaint: here for physical examination and issues  below    HPIp  GERD . Tolerating medication. On ppi still w reflux;roderick [facility] did help someplans to f/u    Morbid obesity : we discussed need for  weight loss via health diet/portion control and if able then  Exercise;  Asthma asympt    hyperchol cv risk 3% last check    Hematuria on preop lab.         becki passed away few days ago. She is coping ok      2 months left lower back pain notrauma. Takes otc nsaid and resolves until am and resumes; no awakening , fever , unexplained wt loss; no urin symptoms; no radiation pain. No abd pain; dull pain not bad so only takes aleve qhs    Past Medical History:   Diagnosis Date    Allergy     seasonal    Asthma     adult onset    GERD (gastroesophageal reflux disease)     gi- dr coffman    History of mammogram 12/30/2016    Hypercholesteremia      Past Surgical History:   Procedure Laterality Date    CHOLECYSTECTOMY      KNEE ARTHROSCOPY W/ PARTIAL MEDIAL MENISCECTOMY      stretta      TOTAL ABDOMINAL HYSTERECTOMY  2000     Family History   Problem Relation Age of Onset    Prostate cancer Father     Ovarian cancer Maternal Aunt     Ovarian cancer Maternal Aunt     Ovarian cancer Maternal Aunt     Ovarian cancer Maternal Aunt      Social History     Socioeconomic History    Marital status:      Spouse name: Not on file    Number of children: Not on file    Years of education: Not on file    Highest education level: Not on file   Occupational History     Employer: other   Social Needs    Financial resource strain: Not on file    Food insecurity:     Worry: Not on file     Inability: Not on file    Transportation needs:     Medical: Not on file     Non-medical: Not on file   Tobacco Use    Smoking status: Never Smoker    Smokeless tobacco: Never Used   Substance and Sexual Activity    Alcohol use: No      Alcohol/week: 0.0 standard drinks    Drug use: No    Sexual activity: Yes     Partners: Male     Comment: hyst   Lifestyle    Physical activity:     Days per week: Not on file     Minutes per session: Not on file    Stress: Not on file   Relationships    Social connections:     Talks on phone: Not on file     Gets together: Not on file     Attends Orthodoxy service: Not on file     Active member of club or organization: Not on file     Attends meetings of clubs or organizations: Not on file     Relationship status: Not on file   Other Topics Concern    Not on file   Social History Narrative    Matthias passed away 3/3/20         Review of Systems  Cardiovascular: no chest pain  Chest: no shortness of breath  Abd: no abd pain  Remainder review of systems negative    Objective:      Physical Exam   Constitutional: She is oriented to person, place, and time. She appears well-developed and well-nourished. No distress.   HENT:   Head: Atraumatic.   Right Ear: External ear normal.   Left Ear: External ear normal.   Nose: Nose normal.   Mouth/Throat: Oropharynx is clear and moist. No oropharyngeal exudate.   bilat tms nl   Eyes: Conjunctivae and EOM are normal. Pupils are equal, round, and reactive to light. No scleral icterus.   Neck: Normal range of motion. Neck supple. No thyromegaly present.   Cardiovascular: Normal rate, regular rhythm and normal heart sounds.    No murmur heard.  Pulmonary/Chest: Effort normal and breath sounds normal. No respiratory distress. She has no wheezes. She has no rales.   Abdominal: Soft. Bowel sounds are normal. She exhibits no distension and no mass. There is no hepatosplenomegaly. There is no tenderness. There is no rebound and no guarding.   Musculoskeletal: Normal range of motion. She exhibits no edema; ttp left lower paralumb area. No redness skin. No mass bilat lower ext 5/5  Lymphadenopathy:     She has no cervical adenopathy.   Neurological: She is alert and oriented to person,  place, and time. No cranial nerve deficit. She exhibits normal muscle tone. Coordination normal.   Skin: Skin is warm. No rash noted. No erythema. No pallor.   Psychiatric: She has a normal mood and affect. Her behavior is normal. Judgment and thought content normal.   Nursing note and vitals reviewed.      Assessment:       1. Routine health maintenance      morbid obesity  Asthma  hyperchol  Hematuria  Low back pain  Plan:     Plans pap soon w dr jaun moseley via [facility]  Wt loss exerc  Lab 12 months and follow up after  Lab tdday  If no better 3-4 weeks  followup sooner if any worsening or change in symptoms or lack of resolution after PT complete    Routine health maintenance  -     Comprehensive metabolic panel; Future; Expected date: 03/05/2020  -     Lipid panel; Future; Expected date: 03/05/2020  -     CBC auto differential; Future; Expected date: 03/05/2020  -     Comprehensive metabolic panel; Future; Expected date: 03/05/2021  -     Lipid panel; Future; Expected date: 03/05/2021  -     Cancel: Influenza - Quadrivalent (6-35 months) (PF)    Morbid obesity  -     TSH; Future; Expected date: 03/05/2020    Mild intermittent asthma without complication    Hematuria, unspecified type  -     Urinalysis; Future    Chronic low back pain without sciatica, unspecified back pain laterality  -     Ambulatory referral/consult to Physical/Occupational Therapy; Future; Expected date: 03/12/2020    Encounter for screening mammogram for malignant neoplasm of breast-external . [facility]  -     Mammo Digital Screening Bilat; Future; Expected date: 03/05/2020    Other orders  -     meloxicam (MOBIC) 15 MG tablet; Take 1 tablet (15 mg total) by mouth daily as needed.  Dispense: 30 tablet; Refill: 1  -     Influenza - Quadrivalent (PF)

## 2020-03-20 ENCOUNTER — TELEPHONE (OUTPATIENT)
Dept: INTERNAL MEDICINE | Facility: CLINIC | Age: 55
End: 2020-03-20

## 2020-03-20 NOTE — TELEPHONE ENCOUNTER
S/w patient. Patient states she was told by the pharmacy there is Sulfa in Meloxicam and she is allergic to Sulfa. Patient would like something else called in.  Please advise.

## 2020-03-20 NOTE — TELEPHONE ENCOUNTER
----- Message from Joshua Camarena sent at 3/20/2020 12:53 PM CDT -----  Contact: Pt   Pt is needing to have something else called in for her med/ was given meloxicam and is allergic to one of the ingredients in the med and needs to have something else called in for her and can be reached at [phone]//daphnie/carl       [facility]/pharmacy #[id number] - [place] - [address] AT [facility]  [address] [address]  Phone: [phone] Fax: [phone]

## 2020-03-20 NOTE — TELEPHONE ENCOUNTER
Informed patient he wanted her to hold naproxen.  Recommends Tylenol Arthritis. States PT hasn't called yet, she was assuming because of Covid-19. Advised her to contact them in 2-3 weeks. Patient verbalized understanding.

## 2020-04-06 ENCOUNTER — PATIENT OUTREACH (OUTPATIENT)
Dept: ADMINISTRATIVE | Facility: HOSPITAL | Age: 55
End: 2020-04-06

## 2020-04-07 ENCOUNTER — PATIENT OUTREACH (OUTPATIENT)
Dept: ADMINISTRATIVE | Facility: HOSPITAL | Age: 55
End: 2020-04-07

## 2020-04-13 RX ORDER — LINACLOTIDE 145 UG/1
CAPSULE, GELATIN COATED ORAL
Qty: 90 CAPSULE | Refills: 3 | Status: SHIPPED | OUTPATIENT
Start: 2020-04-13 | End: 2021-07-15 | Stop reason: SDUPTHER

## 2020-04-13 RX ORDER — PANTOPRAZOLE SODIUM 40 MG/1
TABLET, DELAYED RELEASE ORAL
Qty: 90 TABLET | Refills: 3 | Status: SHIPPED | OUTPATIENT
Start: 2020-04-13

## 2020-05-26 ENCOUNTER — PATIENT OUTREACH (OUTPATIENT)
Dept: ADMINISTRATIVE | Facility: HOSPITAL | Age: 55
End: 2020-05-26

## 2020-06-19 ENCOUNTER — TELEPHONE (OUTPATIENT)
Dept: INTERNAL MEDICINE | Facility: CLINIC | Age: 55
End: 2020-06-19

## 2020-06-19 ENCOUNTER — LAB VISIT (OUTPATIENT)
Dept: INTERNAL MEDICINE | Facility: CLINIC | Age: 55
End: 2020-06-19
Payer: COMMERCIAL

## 2020-06-19 DIAGNOSIS — Z20.822 EXPOSURE TO COVID-19 VIRUS: ICD-10-CM

## 2020-06-19 DIAGNOSIS — Z20.822 EXPOSURE TO COVID-19 VIRUS: Primary | ICD-10-CM

## 2020-06-19 PROCEDURE — U0003 INFECTIOUS AGENT DETECTION BY NUCLEIC ACID (DNA OR RNA); SEVERE ACUTE RESPIRATORY SYNDROME CORONAVIRUS 2 (SARS-COV-2) (CORONAVIRUS DISEASE [COVID-19]), AMPLIFIED PROBE TECHNIQUE, MAKING USE OF HIGH THROUGHPUT TECHNOLOGIES AS DESCRIBED BY CMS-2020-01-R: HCPCS

## 2020-06-19 NOTE — TELEPHONE ENCOUNTER
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 She is requesting to be tested. I informed the pt I would send Chula a message/sergo

## 2020-06-19 NOTE — TELEPHONE ENCOUNTER
----- Message from Alma Prieto sent at 6/19/2020  1:42 PM CDT -----  Regarding: Covid Test  Contact: Pt  Pt is calling the staff regarding questions about the covid -19 testing    Pt call back to  be advised  today [phone]    Thanks

## 2020-06-20 LAB — SARS-COV-2 RNA RESP QL NAA+PROBE: NOT DETECTED

## 2020-07-24 ENCOUNTER — TELEPHONE (OUTPATIENT)
Dept: INTERNAL MEDICINE | Facility: CLINIC | Age: 55
End: 2020-07-24

## 2020-07-24 ENCOUNTER — LAB VISIT (OUTPATIENT)
Dept: LAB | Facility: HOSPITAL | Age: 55
End: 2020-07-24
Attending: FAMILY MEDICINE
Payer: COMMERCIAL

## 2020-07-24 ENCOUNTER — LAB VISIT (OUTPATIENT)
Dept: INTERNAL MEDICINE | Facility: CLINIC | Age: 55
End: 2020-07-24
Payer: COMMERCIAL

## 2020-07-24 DIAGNOSIS — Z20.822 EXPOSURE TO COVID-19 VIRUS: Primary | ICD-10-CM

## 2020-07-24 DIAGNOSIS — Z20.822 EXPOSURE TO COVID-19 VIRUS: ICD-10-CM

## 2020-07-24 LAB — SARS-COV-2 IGG SERPLBLD QL IA.RAPID: NEGATIVE

## 2020-07-24 PROCEDURE — 36415 COLL VENOUS BLD VENIPUNCTURE: CPT

## 2020-07-24 PROCEDURE — 86769 SARS-COV-2 COVID-19 ANTIBODY: CPT

## 2020-07-24 PROCEDURE — U0003 INFECTIOUS AGENT DETECTION BY NUCLEIC ACID (DNA OR RNA); SEVERE ACUTE RESPIRATORY SYNDROME CORONAVIRUS 2 (SARS-COV-2) (CORONAVIRUS DISEASE [COVID-19]), AMPLIFIED PROBE TECHNIQUE, MAKING USE OF HIGH THROUGHPUT TECHNOLOGIES AS DESCRIBED BY CMS-2020-01-R: HCPCS

## 2020-07-24 NOTE — TELEPHONE ENCOUNTER
----- Message from Gela Terry sent at 7/24/2020 10:33 AM CDT -----  Regarding: covid screening  Pt is calling in regards to being exposed to Covid 19 is wanting to be screened. Please call back [phone]  Thanks

## 2020-07-26 LAB — SARS-COV-2 RNA RESP QL NAA+PROBE: NOT DETECTED

## 2020-09-08 ENCOUNTER — OFFICE VISIT (OUTPATIENT)
Dept: INTERNAL MEDICINE | Facility: CLINIC | Age: 55
End: 2020-09-08
Payer: COMMERCIAL

## 2020-09-08 ENCOUNTER — TELEPHONE (OUTPATIENT)
Dept: INTERNAL MEDICINE | Facility: CLINIC | Age: 55
End: 2020-09-08

## 2020-09-08 VITALS
TEMPERATURE: 97 F | BODY MASS INDEX: 40.64 KG/M2 | DIASTOLIC BLOOD PRESSURE: 72 MMHG | SYSTOLIC BLOOD PRESSURE: 114 MMHG | RESPIRATION RATE: 18 BRPM | HEART RATE: 66 BPM | OXYGEN SATURATION: 99 % | HEIGHT: 71 IN | WEIGHT: 290.25 LBS

## 2020-09-08 DIAGNOSIS — R22.0 RIGHT FACIAL SWELLING: ICD-10-CM

## 2020-09-08 DIAGNOSIS — Z84.0 FAMILY HISTORY OF ANGIOEDEMA: ICD-10-CM

## 2020-09-08 DIAGNOSIS — Z09 HOSPITAL DISCHARGE FOLLOW-UP: Primary | ICD-10-CM

## 2020-09-08 PROCEDURE — 99213 PR OFFICE/OUTPT VISIT, EST, LEVL III, 20-29 MIN: ICD-10-PCS | Mod: S$GLB,,, | Performed by: FAMILY MEDICINE

## 2020-09-08 PROCEDURE — 99999 PR PBB SHADOW E&M-EST. PATIENT-LVL V: ICD-10-PCS | Mod: PBBFAC,,, | Performed by: FAMILY MEDICINE

## 2020-09-08 PROCEDURE — 99213 OFFICE O/P EST LOW 20 MIN: CPT | Mod: S$GLB,,, | Performed by: FAMILY MEDICINE

## 2020-09-08 PROCEDURE — 3008F PR BODY MASS INDEX (BMI) DOCUMENTED: ICD-10-PCS | Mod: CPTII,S$GLB,, | Performed by: FAMILY MEDICINE

## 2020-09-08 PROCEDURE — 99999 PR PBB SHADOW E&M-EST. PATIENT-LVL V: CPT | Mod: PBBFAC,,, | Performed by: FAMILY MEDICINE

## 2020-09-08 PROCEDURE — 3008F BODY MASS INDEX DOCD: CPT | Mod: CPTII,S$GLB,, | Performed by: FAMILY MEDICINE

## 2020-09-08 RX ORDER — EPINEPHRINE 0.3 MG/.3ML
INJECTION SUBCUTANEOUS
Qty: 2 EACH | Refills: 1 | Status: SHIPPED | OUTPATIENT
Start: 2020-09-08 | End: 2022-04-25 | Stop reason: SDUPTHER

## 2020-09-08 NOTE — PROGRESS NOTES
"Subjective:       Patient ID: Pebbles Gonzalez is a 55 y.o. female.    Chief Complaint: Facial Swelling (pt reports swelling in R jaw statrting this morning, pt states she went to the ER 9/6/20 for swelling )    HPI  Onset childhood  [facility] maternal angioedema after drinking pepsi  Here for hosp f/u  Denies anaphylaxis    unk inciting event except possible new lysol spray  Denies changes to diet or meds  A/w Occasional swelling of arms and Feet pruritis   Better with benadryl  Was prescribed epi pen but needs a new rx to get two filled  Has not used recently   Last used epi pen 10 years ago for facial swelling a/w dust exposure  Recurring swelling of jaw, right  Review of Systems   Constitutional: Negative for fever.   HENT: Positive for facial swelling. Negative for dental problem.         Objective:   /72 (BP Location: Right arm, Patient Position: Sitting, BP Method: Large (Manual))   Pulse 66   Temp 96.6 °F (35.9 °C) (Tympanic)   Resp 18   Ht 5' 11" (1.803 m)   Wt 131.7 kg (290 lb 3.8 oz)   SpO2 99%   BMI 40.48 kg/m²     BP Readings from Last 3 Encounters:   09/08/20 114/72   03/05/20 122/80   03/05/19 116/66       No results found for: LABA1C, HGBA1C    Physical Exam  Vitals signs reviewed.   Constitutional:       General: She is not in acute distress.     Appearance: Normal appearance. She is well-developed. She is not ill-appearing or toxic-appearing.   HENT:      Head: Normocephalic and atraumatic.        Right Ear: Hearing normal.      Left Ear: Hearing normal.      Mouth/Throat:      Lips: Pink. No lesions.      Mouth: Mucous membranes are moist.      Dentition: Normal dentition. No dental tenderness.   Neck:      Musculoskeletal: Normal range of motion.   Cardiovascular:      Rate and Rhythm: Normal rate.   Pulmonary:      Effort: Pulmonary effort is normal. No respiratory distress.   Abdominal:      Palpations: Abdomen is soft.   Musculoskeletal: Normal range of motion.   Skin:     General: " Skin is warm and dry.   Neurological:      Mental Status: She is alert and oriented to person, place, and time.   Psychiatric:         Behavior: Behavior normal.       Assessment:     1. Hospital discharge follow-up    2. Right facial swelling    3. Family history of angioedema      Plan:     Problem List Items Addressed This Visit     None      Visit Diagnoses     Hospital discharge follow-up    -  Primary    Relevant Orders    Ambulatory referral/consult to Allergy    Right facial swelling        Relevant Medications    EPINEPHrine (EPIPEN 2-ELIZABETH) 0.3 mg/0.3 mL AtIn    Other Relevant Orders    Ambulatory referral/consult to Allergy    Family history of angioedema        Relevant Medications    EPINEPHrine (EPIPEN 2-ELIZABETH) 0.3 mg/0.3 mL AtIn    Other Relevant Orders    Ambulatory referral/consult to Allergy      advised h2 blocker in addition to benadryl  Refill epi    Follow up if symptoms worsen or fail to improve.

## 2020-09-08 NOTE — PATIENT INSTRUCTIONS
Famotidine tablets or gelcaps  What is this medicine?  FAMOTIDINE (fa TATA lai) is a type of antihistamine that blocks the release of stomach acid. It is used to treat stomach or intestinal ulcers. It can also relieve heartburn from acid reflux.  How should I use this medicine?  Take this medicine by mouth with a glass of water. Follow the directions on the prescription label. If you only take this medicine once a day, take it at bedtime. Take your doses at regular intervals. Do not take your medicine more often than directed.  Talk to your pediatrician regarding the use of this medicine in children. Special care may be needed.  What side effects may I notice from receiving this medicine?  Side effects that you should report to your doctor or health care professional as soon as possible:  · agitation, nervousness  · confusion  · hallucinations  · skin rash, itching  Side effects that usually do not require medical attention (report to your doctor or health care professional if they continue or are bothersome):  · constipation  · diarrhea  · dizziness  · headache  What may interact with this medicine?  · delavirdine  · itraconazole  · ketoconazole  What if I miss a dose?  If you miss a dose, take it as soon as you can. If it is almost time for your next dose, take only that dose. Do not take double or extra doses.  Where should I keep my medicine?  Keep out of the reach of children.  Store at room temperature between 15 and 30 degrees C (59 and 86 degrees F). Do not freeze. Throw away any unused medicine after the expiration date.  What should I tell my health care provider before I take this medicine?  They need to know if you have any of these conditions:  · kidney or liver disease  · trouble swallowing  · an unusual or allergic reaction to famotidine, other medicines, foods, dyes, or preservatives  · pregnant or trying to get pregnant  · breast-feeding  What should I watch for while using this medicine?  Tell  your doctor or health care professional if your condition does not start to get better or if it gets worse. Finish the full course of tablets prescribed, even if you feel better.  Do not take with aspirin, ibuprofen or other antiinflammatory medicines. These can make your condition worse.  Do not smoke cigarettes or drink alcohol. These cause irritation in your stomach and can increase the time it will take for ulcers to heal.  If you get black, tarry stools or vomit up what looks like coffee grounds, call your doctor or health care professional at once. You may have a bleeding ulcer.  NOTE:This sheet is a summary. It may not cover all possible information. If you have questions about this medicine, talk to your doctor, pharmacist, or health care provider. Copyright© 2017 [employer]

## 2020-09-08 NOTE — TELEPHONE ENCOUNTER
I called the pt and assisted her with scheduling an appt as she called in but the message did not come through her chart. //kah

## 2020-09-09 ENCOUNTER — LAB VISIT (OUTPATIENT)
Dept: LAB | Facility: HOSPITAL | Age: 55
End: 2020-09-09
Attending: ALLERGY & IMMUNOLOGY
Payer: COMMERCIAL

## 2020-09-09 ENCOUNTER — OFFICE VISIT (OUTPATIENT)
Dept: ALLERGY | Facility: CLINIC | Age: 55
End: 2020-09-09
Payer: COMMERCIAL

## 2020-09-09 VITALS
BODY MASS INDEX: 40.84 KG/M2 | SYSTOLIC BLOOD PRESSURE: 116 MMHG | HEART RATE: 70 BPM | HEIGHT: 71 IN | WEIGHT: 291.69 LBS | DIASTOLIC BLOOD PRESSURE: 73 MMHG | TEMPERATURE: 99 F

## 2020-09-09 DIAGNOSIS — J30.1 SEASONAL ALLERGIC RHINITIS DUE TO POLLEN: Primary | ICD-10-CM

## 2020-09-09 DIAGNOSIS — R22.0 RIGHT FACIAL SWELLING: ICD-10-CM

## 2020-09-09 DIAGNOSIS — Z84.0 FAMILY HISTORY OF ANGIOEDEMA: ICD-10-CM

## 2020-09-09 DIAGNOSIS — Z09 HOSPITAL DISCHARGE FOLLOW-UP: ICD-10-CM

## 2020-09-09 DIAGNOSIS — J30.1 SEASONAL ALLERGIC RHINITIS DUE TO POLLEN: ICD-10-CM

## 2020-09-09 LAB
BASOPHILS # BLD AUTO: 0.03 K/UL (ref 0–0.2)
BASOPHILS NFR BLD: 0.3 % (ref 0–1.9)
DIFFERENTIAL METHOD: ABNORMAL
EOSINOPHIL # BLD AUTO: 0.2 K/UL (ref 0–0.5)
EOSINOPHIL NFR BLD: 1.7 % (ref 0–8)
ERYTHROCYTE [DISTWIDTH] IN BLOOD BY AUTOMATED COUNT: 16.5 % (ref 11.5–14.5)
HCT VFR BLD AUTO: 42.9 % (ref 37–48.5)
HGB BLD-MCNC: 12.9 G/DL (ref 12–16)
IMM GRANULOCYTES # BLD AUTO: 0.03 K/UL (ref 0–0.04)
IMM GRANULOCYTES NFR BLD AUTO: 0.3 % (ref 0–0.5)
LYMPHOCYTES # BLD AUTO: 4.5 K/UL (ref 1–4.8)
LYMPHOCYTES NFR BLD: 45.1 % (ref 18–48)
MCH RBC QN AUTO: 28.1 PG (ref 27–31)
MCHC RBC AUTO-ENTMCNC: 30.1 G/DL (ref 32–36)
MCV RBC AUTO: 94 FL (ref 82–98)
MONOCYTES # BLD AUTO: 0.7 K/UL (ref 0.3–1)
MONOCYTES NFR BLD: 6.6 % (ref 4–15)
NEUTROPHILS # BLD AUTO: 4.6 K/UL (ref 1.8–7.7)
NEUTROPHILS NFR BLD: 46 % (ref 38–73)
NRBC BLD-RTO: 0 /100 WBC
PLATELET # BLD AUTO: 366 K/UL (ref 150–350)
PMV BLD AUTO: 10.4 FL (ref 9.2–12.9)
RBC # BLD AUTO: 4.59 M/UL (ref 4–5.4)
WBC # BLD AUTO: 9.96 K/UL (ref 3.9–12.7)

## 2020-09-09 PROCEDURE — 86003 ALLG SPEC IGE CRUDE XTRC EA: CPT

## 2020-09-09 PROCEDURE — 86160 COMPLEMENT ANTIGEN: CPT

## 2020-09-09 PROCEDURE — 36415 COLL VENOUS BLD VENIPUNCTURE: CPT

## 2020-09-09 PROCEDURE — 86160 COMPLEMENT ANTIGEN: CPT | Mod: 59

## 2020-09-09 PROCEDURE — 86162 COMPLEMENT TOTAL (CH50): CPT

## 2020-09-09 PROCEDURE — 86003 ALLG SPEC IGE CRUDE XTRC EA: CPT | Mod: 59

## 2020-09-09 PROCEDURE — 99999 PR PBB SHADOW E&M-EST. PATIENT-LVL IV: ICD-10-PCS | Mod: PBBFAC,,, | Performed by: ALLERGY & IMMUNOLOGY

## 2020-09-09 PROCEDURE — 86038 ANTINUCLEAR ANTIBODIES: CPT

## 2020-09-09 PROCEDURE — 82785 ASSAY OF IGE: CPT

## 2020-09-09 PROCEDURE — 99244 PR OFFICE CONSULTATION,LEVEL IV: ICD-10-PCS | Mod: S$GLB,,, | Performed by: ALLERGY & IMMUNOLOGY

## 2020-09-09 PROCEDURE — 99244 OFF/OP CNSLTJ NEW/EST MOD 40: CPT | Mod: S$GLB,,, | Performed by: ALLERGY & IMMUNOLOGY

## 2020-09-09 PROCEDURE — 84165 PROTEIN E-PHORESIS SERUM: CPT | Mod: 26,,, | Performed by: PATHOLOGY

## 2020-09-09 PROCEDURE — 80053 COMPREHEN METABOLIC PANEL: CPT

## 2020-09-09 PROCEDURE — 86161 COMPLEMENT/FUNCTION ACTIVITY: CPT

## 2020-09-09 PROCEDURE — 86705 HEP B CORE ANTIBODY IGM: CPT

## 2020-09-09 PROCEDURE — 84165 PROTEIN E-PHORESIS SERUM: CPT

## 2020-09-09 PROCEDURE — 85025 COMPLETE CBC W/AUTO DIFF WBC: CPT

## 2020-09-09 PROCEDURE — 86332 IMMUNE COMPLEX ASSAY: CPT

## 2020-09-09 PROCEDURE — 84165 PATHOLOGIST INTERPRETATION SPE: ICD-10-PCS | Mod: 26,,, | Performed by: PATHOLOGY

## 2020-09-09 PROCEDURE — 99999 PR PBB SHADOW E&M-EST. PATIENT-LVL IV: CPT | Mod: PBBFAC,,, | Performed by: ALLERGY & IMMUNOLOGY

## 2020-09-09 PROCEDURE — 84443 ASSAY THYROID STIM HORMONE: CPT

## 2020-09-09 RX ORDER — FAMOTIDINE 20 MG/1
20 TABLET, FILM COATED ORAL 2 TIMES DAILY
COMMUNITY
End: 2020-09-09

## 2020-09-09 RX ORDER — LEVOCETIRIZINE DIHYDROCHLORIDE 5 MG/1
5 TABLET, FILM COATED ORAL NIGHTLY
Qty: 30 TABLET | Refills: 11 | Status: SHIPPED | OUTPATIENT
Start: 2020-09-09 | End: 2022-03-15 | Stop reason: SDUPTHER

## 2020-09-09 RX ORDER — FAMOTIDINE 40 MG/1
40 TABLET, FILM COATED ORAL DAILY
Qty: 30 TABLET | Refills: 11 | Status: SHIPPED | OUTPATIENT
Start: 2020-09-09 | End: 2023-03-20

## 2020-09-09 NOTE — PATIENT INSTRUCTIONS
Recommend avoidance of NSAIDs(motrin, mobic, ibuprofen, naproxen, etc).  Recommend avoidance of any items known to cause symptoms- sulfites, etc.  Recommend that she carry an Epipen 2 pack with her at all times.  Labs today.  Recommend Pepcid and Xyzal daily for the next 30 days.

## 2020-09-09 NOTE — PROGRESS NOTES
"Subjective:       Patient ID: Pebbles Gonzalez is a 55 y.o. female.    Initial Visit    Chief Complaint:  Other (angioedema)      HPI: 55 year old female complaining of angioedema. Three days ago, she experienced tongue and right facial swelling. She was given Pepcid, Epinephrine and steroid. Tongue went down, but lips and the side of her face are swelling. The right side of her body swells as well.  She reports a history of swelling and itching of the body parts that usually occurs on the feet and arms but recently symptoms have started occurring on her face. She has been having these symptoms "throughout my life". Symptoms have worsened over the last month.  Child- Arimis cologne caused facial swelling; sulfites can cause swelling  Triggers- sulfites, "too much seafood", dust  She has an Epipen prescription.  She has not had to use one in ten years.  Mom and brother- angioedema.  Mom- pepsi- tongue swelling  Brother-pepefren,  Pepper and some fruits causes tongue swelling    She reports runny nose, sneezing, itchy and watery eyes in the Fall.She takes Flonase daily helps allergic rhinitis. Dust exacerbates nasal and eye symptoms. She reports allergy testing over 20 years ago. She was told that she was allergic to dust, cockroach and pet dander. She denies sinus surgery. She reports infrequent sinus infections.    No asthma symptoms over the last 10 years.  She is not using the inhaler in the chart.  She is not taking mobic or aspirin.    Reviewed [facility] notes- C1 esterase level normal      Past Medical History:   Diagnosis Date    Allergy     seasonal    Asthma     adult onset    GERD (gastroesophageal reflux disease)     gi- dr coffman    History of mammogram 12/30/2016    Hypercholesteremia      Family History   Problem Relation Age of Onset    Prostate cancer Father     Ovarian cancer Maternal Aunt     Ovarian cancer Maternal Aunt     Ovarian cancer Maternal Aunt     Ovarian cancer " Maternal Aunt      Medications:  Reviewed.    Review of patient's allergies indicates:   Allergen Reactions    Sulfa (sulfonamide antibiotics) Swelling and Anaphylaxis    Latex, natural rubber Other (See Comments)     Skin burns       Environmental History: No pets. Not smoker.  Review of Systems   Constitutional: Negative for chills and fever.   HENT: Negative for congestion and rhinorrhea.    Eyes: Negative for discharge and itching.   Respiratory: Negative for cough, shortness of breath and wheezing.    Cardiovascular: Negative for chest pain and leg swelling.   Gastrointestinal: Negative for nausea and vomiting.   Musculoskeletal: Negative for arthralgias and gait problem.   Skin: Negative for rash and wound.        Facial swelling   Allergic/Immunologic: Positive for environmental allergies. Negative for food allergies.   Neurological: Negative for facial asymmetry and speech difficulty.   Hematological: Negative for adenopathy. Does not bruise/bleed easily.   Psychiatric/Behavioral: Negative for behavioral problems and suicidal ideas.        Objective:    Physical Exam  Vitals signs reviewed.   Constitutional:       General: She is not in acute distress.     Appearance: She is well-developed. She is obese. She is not ill-appearing, toxic-appearing or diaphoretic.   HENT:      Head: Normocephalic and atraumatic.      Right Ear: Tympanic membrane, ear canal and external ear normal. There is no impacted cerumen.      Left Ear: Tympanic membrane, ear canal and external ear normal. There is no impacted cerumen.      Nose: Nose normal. No congestion or rhinorrhea.      Mouth/Throat:      Pharynx: No oropharyngeal exudate or posterior oropharyngeal erythema.   Eyes:      General: No scleral icterus.        Right eye: No discharge.         Left eye: No discharge.      Pupils: Pupils are equal, round, and reactive to light.   Neck:      Musculoskeletal: Normal range of motion and neck supple.      Thyroid: No  thyromegaly.   Cardiovascular:      Rate and Rhythm: Normal rate and regular rhythm.      Heart sounds: Normal heart sounds. No murmur. No friction rub. No gallop.    Pulmonary:      Effort: Pulmonary effort is normal. No respiratory distress.      Breath sounds: Normal breath sounds. No stridor. No wheezing, rhonchi or rales.   Abdominal:      General: Bowel sounds are normal. There is no distension.      Palpations: Abdomen is soft. There is no mass.      Tenderness: There is no abdominal tenderness. There is no guarding.      Hernia: No hernia is present.   Musculoskeletal: Normal range of motion.         General: No swelling, tenderness, deformity or signs of injury.      Right lower leg: No edema.      Left lower leg: No edema.   Lymphadenopathy:      Cervical: No cervical adenopathy.   Skin:     General: Skin is warm.      Coloration: Skin is not jaundiced or pale.      Findings: No bruising, erythema or lesion.   Neurological:      General: No focal deficit present.      Mental Status: She is alert and oriented to person, place, and time.      Gait: Gait normal.   Psychiatric:         Mood and Affect: Mood normal.         Behavior: Behavior normal.         Thought Content: Thought content normal.         Judgment: Judgment normal.           Assessment:       1. Seasonal allergic rhinitis due to pollen    2. Hospital discharge follow-up    3. Right facial swelling    4. Family history of angioedema         Plan:       Seasonal allergic rhinitis due to pollen  -     IgE; Future; Expected date: 09/09/2020  -     Bahia grass IgE; Future; Expected date: 09/09/2020  -     Aspergillus fumagatus IgE; Future; Expected date: 09/09/2020  -     Chaetomium globosum IgE; Future; Expected date: 09/09/2020  -     Cockroach, American IgE; Future; Expected date: 09/09/2020  -     Cladosporium IgE; Future; Expected date: 09/09/2020  -     Curvularia lunata IgE; Future; Expected date: 09/09/2020  -     D. farinae IgE; Future;  Expected date: 09/09/2020  -     D. pteronyssinus IgE; Future; Expected date: 09/09/2020  -     Dog dander IgE; Future; Expected date: 09/09/2020  -     Plantain, English IgE; Future; Expected date: 09/09/2020  -     Eucalyptus IgE; Future; Expected date: 09/09/2020  -     Marsh elder, rough IgE; Future; Expected date: 09/09/2020  -     Mugwort IgE; Future; Expected date: 09/09/2020  -     Nettle IgE; Future; Expected date: 09/09/2020  -     Orchard grass IgE; Future; Expected date: 09/09/2020  -     Wallingford, western white IgE; Future; Expected date: 09/09/2020  -     Ragweed, short, common IgE; Future; Expected date: 09/09/2020  -     Red top grass IgE; Future; Expected date: 09/09/2020  -     Rye grass, cultivated IgE; Future; Expected date: 09/09/2020  -     Thistle, Russian IgE; Future; Expected date: 09/09/2020  -     Stemphyllium IgE; Future; Expected date: 09/09/2020  -     Oleg IgE; Future; Expected date: 09/09/2020  -     Jacob grass IgE; Future; Expected date: 09/09/2020  -     Allergen, Pecan Tree IgE; Future; Expected date: 09/09/2020  -     Grant, black IgE; Future; Expected date: 09/09/2020  -     Lejunior, bald IgE; Future; Expected date: 09/09/2020  -     Oak, white IgE; Future; Expected date: 09/09/2020  -     Allergen, Cocklebur; Future; Expected date: 09/09/2020  -     Cat epithelium IgE; Future; Expected date: 09/09/2020  -     Allergen, Hackberry Celtis; Future; Expected date: 09/09/2020  -     Allergen, Elm Cedar; Future; Expected date: 09/09/2020  -     Allergen-[place]; Future; Expected date: 09/09/2020    Hospital discharge follow-up  -     Ambulatory referral/consult to Allergy    Right facial swelling  -     Ambulatory referral/consult to Allergy  -     C1 Esterase Inhibitor Panel; Future; Expected date: 09/09/2020  -     C1 Esterase Inhibitor, Functional; Future; Expected date: 09/09/2020  -     C1Q Binding Assay; Future; Expected date: 09/09/2020  -     CBC auto differential;  Future; Expected date: 09/09/2020  -     Comprehensive metabolic panel; Future; Expected date: 09/09/2020  -     C3 complement; Future; Expected date: 09/09/2020  -     C4 complement; Future; Expected date: 09/09/2020  -     Protein electrophoresis, serum; Future; Expected date: 09/09/2020  -     Complement, total; Future; Expected date: 09/09/2020  -     CU (Chronic Urticaria) Index Panel; Future; Expected date: 09/09/2020  -     DAVID Screen w/Reflex; Future; Expected date: 09/09/2020  -     Stool Exam-Ova,Cysts,Parasites; Future; Expected date: 09/09/2020  -     Ascaris IgE; Future; Expected date: 09/09/2020  -     Hepatitis B core antibody, IgM; Future; Expected date: 09/09/2020  -     levocetirizine (XYZAL) 5 MG tablet; Take 1 tablet (5 mg total) by mouth every evening.  Dispense: 30 tablet; Refill: 11    Family history of angioedema  -     Ambulatory referral/consult to Allergy  -     C1 Esterase Inhibitor Panel; Future; Expected date: 09/09/2020  -     C1 Esterase Inhibitor, Functional; Future; Expected date: 09/09/2020  -     C1Q Binding Assay; Future; Expected date: 09/09/2020  -     CBC auto differential; Future; Expected date: 09/09/2020  -     Comprehensive metabolic panel; Future; Expected date: 09/09/2020  -     C3 complement; Future; Expected date: 09/09/2020  -     C4 complement; Future; Expected date: 09/09/2020  -     Protein electrophoresis, serum; Future; Expected date: 09/09/2020  -     Complement, total; Future; Expected date: 09/09/2020  -     levocetirizine (XYZAL) 5 MG tablet; Take 1 tablet (5 mg total) by mouth every evening.  Dispense: 30 tablet; Refill: 11  -     famotidine (PEPCID) 40 MG tablet; Take 1 tablet (40 mg total) by mouth once daily.  Dispense: 30 tablet; Refill: 11          Recommend avoidance of NSAIDs(motrin, mobic, ibuprofen, naproxen, etc).  Recommend avoidance of any items known to cause symptoms- sulfites, etc.  Recommend that she carry an Epipen 2 pack with her at all  times.  Labs today.  Recommend Pepcid and Xyzal daily for the next 30 days. Epipen 2 pack with her at all times.  Keep a diary of foods and any medications taken.  If diagnosed with HTN in the future avoid ACE inhibitors. ACE inhibitors listed as an allergy.  Will contact with lab results, once they become available.  RTC 2-4 weeks or sooner, if needed.    JUAN R AGARWAL

## 2020-09-09 NOTE — LETTER
September 13, 2020      Eliud Rodas MD  [id number] [place] [address]           [facility]  [address] [place] [id number]  Phone: [phone]  Fax: [phone]          Patient: Pebbles Gonzalez   MR Number: [id number]   YOB: 1965   Date of Visit: 9/9/2020       Dear Dr. Eliud Rodas:    Thank you for referring Pebbles Gonzalez to me for evaluation. Attached you will find relevant portions of my assessment and plan of care.    If you have questions, please do not hesitate to call me. I look forward to following Pebbles Gonzalez along with you.    Sincerely,    Faustina Cruz MD    Enclosure  CC:  No Recipients    If you would like to receive this communication electronically, please contact [email or web address] or [phone] to request more information on [employer] access.    For providers and/or their staff who would like to refer a patient to [facility], please contact us through our one-stop-shop provider referral line, [facility] , at [phone].    If you feel you have received this communication in error or would no longer like to receive these types of communications, please e-mail [email or web address]

## 2020-09-10 LAB
ALBUMIN SERPL BCP-MCNC: 3.5 G/DL (ref 3.5–5.2)
ALBUMIN SERPL ELPH-MCNC: 3.78 G/DL (ref 3.35–5.55)
ALP SERPL-CCNC: 130 U/L (ref 55–135)
ALPHA1 GLOB SERPL ELPH-MCNC: 0.29 G/DL (ref 0.17–0.41)
ALPHA2 GLOB SERPL ELPH-MCNC: 0.92 G/DL (ref 0.43–0.99)
ALT SERPL W/O P-5'-P-CCNC: 9 U/L (ref 10–44)
ANA SER QL IF: NORMAL
ANION GAP SERPL CALC-SCNC: 8 MMOL/L (ref 8–16)
AST SERPL-CCNC: 10 U/L (ref 10–40)
B-GLOBULIN SERPL ELPH-MCNC: 0.86 G/DL (ref 0.5–1.1)
BILIRUB SERPL-MCNC: 0.3 MG/DL (ref 0.1–1)
BUN SERPL-MCNC: 8 MG/DL (ref 6–20)
C3 SERPL-MCNC: 164 MG/DL (ref 50–180)
C4 SERPL-MCNC: 40 MG/DL (ref 11–44)
CALCIUM SERPL-MCNC: 8.6 MG/DL (ref 8.7–10.5)
CHLORIDE SERPL-SCNC: 102 MMOL/L (ref 95–110)
CO2 SERPL-SCNC: 27 MMOL/L (ref 23–29)
CREAT SERPL-MCNC: 0.8 MG/DL (ref 0.5–1.4)
EST. GFR  (AFRICAN AMERICAN): >60 ML/MIN/1.73 M^2
EST. GFR  (NON AFRICAN AMERICAN): >60 ML/MIN/1.73 M^2
GAMMA GLOB SERPL ELPH-MCNC: 1.16 G/DL (ref 0.67–1.58)
GLUCOSE SERPL-MCNC: 74 MG/DL (ref 70–110)
HBV CORE IGM SERPL QL IA: NEGATIVE
IGE SERPL-ACNC: 258 IU/ML (ref 0–100)
PATHOLOGIST INTERPRETATION SPE: NORMAL
POTASSIUM SERPL-SCNC: 3.9 MMOL/L (ref 3.5–5.1)
PROT SERPL-MCNC: 7 G/DL (ref 6–8.4)
PROT SERPL-MCNC: 7.4 G/DL (ref 6–8.4)
SODIUM SERPL-SCNC: 137 MMOL/L (ref 136–145)

## 2020-09-13 LAB — C1INH SERPL-MCNC: 35 MG/DL (ref 21–39)

## 2020-09-14 LAB
CH50 SERPL-ACNC: >107 U/ML (ref 42–95)
IC SERPL C1Q BIND-ACNC: 3.1 UG EQ/ML (ref 0–3.9)

## 2020-09-15 LAB
A FUMIGATUS IGE QN: <0.1 KU/L
ALLERGEN CHAETOMIUM GLOBOSUM IGE: <0.1 KU/L
ALLERGEN WHITE PINE TREE IGE: <0.1 KU/L
BAHIA GRASS IGE QN: <0.1 KU/L
BALD CYPRESS IGE QN: <0.1 KU/L
C HERBARUM IGE QN: <0.1 KU/L
C LUNATA IGE QN: <0.1 KU/L
C1INH FUNCTIONAL/C1INH TOTAL MFR SERPL: >100 %
CAT DANDER IGE QN: <0.1 KU/L
CHAETOMIUM GLOB. CLASS: NORMAL
COCKLEBUR IGE QN: <0.1 KU/L
COCKSFOOT IGE QN: <0.1 KU/L
COMMON RAGWEED IGE QN: 0.15 KU/L
COTTONWOOD IGE QN: <0.1 KU/L
D FARINAE IGE QN: 0.65 KU/L
D PTERONYSS IGE QN: 0.82 KU/L
DEPRECATED A FUMIGATUS IGE RAST QL: NORMAL
DEPRECATED BAHIA GRASS IGE RAST QL: NORMAL
DEPRECATED BALD CYPRESS IGE RAST QL: NORMAL
DEPRECATED C HERBARUM IGE RAST QL: NORMAL
DEPRECATED C LUNATA IGE RAST QL: NORMAL
DEPRECATED CAT DANDER IGE RAST QL: NORMAL
DEPRECATED COCKLEBUR IGE RAST QL: NORMAL
DEPRECATED COCKSFOOT IGE RAST QL: NORMAL
DEPRECATED COMMON RAGWEED IGE RAST QL: ABNORMAL
DEPRECATED COTTONWOOD IGE RAST QL: NORMAL
DEPRECATED D FARINAE IGE RAST QL: ABNORMAL
DEPRECATED D PTERONYSS IGE RAST QL: ABNORMAL
DEPRECATED DOG DANDER IGE RAST QL: NORMAL
DEPRECATED ELDER IGE RAST QL: ABNORMAL
DEPRECATED ENGL PLANTAIN IGE RAST QL: NORMAL
DEPRECATED GUM-TREE IGE RAST QL: NORMAL
DEPRECATED HACKBERRY TREE IGE RAST QL: NORMAL
DEPRECATED JOHNSON GRASS IGE RAST QL: NORMAL
DEPRECATED MUGWORT IGE RAST QL: ABNORMAL
DEPRECATED NETTLE IGE RAST QL: NORMAL
DEPRECATED PECAN/HICK TREE IGE RAST QL: NORMAL
DEPRECATED PER RYE GRASS IGE RAST QL: NORMAL
DEPRECATED RED TOP GRASS IGE RAST QL: NORMAL
DEPRECATED ROACH IGE RAST QL: ABNORMAL
DEPRECATED SALTWORT IGE RAST QL: ABNORMAL
DEPRECATED TIMOTHY IGE RAST QL: NORMAL
DEPRECATED WHITE OAK IGE RAST QL: NORMAL
DEPRECATED WILLOW IGE RAST QL: NORMAL
DOG DANDER IGE QN: <0.1 KU/L
ELDER IGE QN: 0.23 KU/L
ELM CEDAR CLASS: NORMAL
ELM CEDAR, IGE: <0.1 KU/L
ENGL PLANTAIN IGE QN: <0.1 KU/L
GUM-TREE IGE QN: <0.1 KU/L
HACKBERRY CELTIS, IGE: <0.1 KU/L
JOHNSON GRASS IGE QN: 0.1 KU/L
MUGWORT IGE QN: 0.13 KU/L
NETTLE IGE QN: <0.1 KU/L
PECAN/HICK TREE IGE QN: <0.1 KU/L
PER RYE GRASS IGE QN: <0.1 KU/L
RED TOP GRASS IGE QN: 0.1 KU/L
ROACH IGE QN: 0.14 KU/L
SALTWORT IGE QN: 0.15 KU/L
STEMPHYLIUM HERBARUM CLASS: NORMAL
STEMPHYLLIUM, IGE: <0.1 KU/L
TIMOTHY IGE QN: <0.1 KU/L
WHITE OAK IGE QN: <0.1 KU/L
WHITE PINE CLASS: NORMAL
WILLOW IGE QN: <0.1 KU/L

## 2020-09-16 ENCOUNTER — PATIENT OUTREACH (OUTPATIENT)
Dept: ADMINISTRATIVE | Facility: HOSPITAL | Age: 55
End: 2020-09-16

## 2020-09-17 LAB
A LUMBRIC IGE QN: <0.1 KU/L
DEPRECATED A LUMBRIC IGE RAST QL: 0

## 2020-09-18 LAB
CU INDEX: <3.8
CU, ANTI-THYROGLOBULIN IGG: <20 IU/ML
CU, ANTI-THYROID PEROXIDASE IGG: <10 IU/ML
CU, TSH (THYROTROPIN): 1.12 UIU/ML (ref 0.4–4)

## 2020-10-14 ENCOUNTER — PATIENT OUTREACH (OUTPATIENT)
Dept: ADMINISTRATIVE | Facility: OTHER | Age: 55
End: 2020-10-14

## 2020-10-14 NOTE — PROGRESS NOTES
Health Maintenance Due   Topic Date Due    Shingles Vaccine (1 of 2) 02/18/2014    Influenza Vaccine (1) 08/01/2020     Updates were requested from care everywhere.  Chart was reviewed for overdue Proactive [facility] Encounters ([facility]) topics (CRS, Breast Cancer Screening, Eye exam)  Health Maintenance has been updated.  LINKS immunization registry triggered.  Immunizations were reconciled.

## 2020-10-15 ENCOUNTER — OFFICE VISIT (OUTPATIENT)
Dept: ALLERGY | Facility: CLINIC | Age: 55
End: 2020-10-15
Payer: COMMERCIAL

## 2020-10-15 VITALS
TEMPERATURE: 98 F | SYSTOLIC BLOOD PRESSURE: 118 MMHG | BODY MASS INDEX: 41.7 KG/M2 | HEIGHT: 70 IN | RESPIRATION RATE: 18 BRPM | DIASTOLIC BLOOD PRESSURE: 75 MMHG | WEIGHT: 291.25 LBS | HEART RATE: 76 BPM

## 2020-10-15 DIAGNOSIS — Z23 INFLUENZA VACCINE ADMINISTERED: ICD-10-CM

## 2020-10-15 DIAGNOSIS — J30.89 ALLERGIC RHINITIS DUE TO AMERICAN HOUSE DUST MITE: ICD-10-CM

## 2020-10-15 DIAGNOSIS — T78.3XXS ALLERGIC ANGIOEDEMA, SEQUELA: Primary | ICD-10-CM

## 2020-10-15 PROCEDURE — 99999 PR PBB SHADOW E&M-EST. PATIENT-LVL IV: CPT | Mod: PBBFAC,,, | Performed by: ALLERGY & IMMUNOLOGY

## 2020-10-15 PROCEDURE — 99214 PR OFFICE/OUTPT VISIT, EST, LEVL IV, 30-39 MIN: ICD-10-PCS | Mod: 25,S$GLB,, | Performed by: ALLERGY & IMMUNOLOGY

## 2020-10-15 PROCEDURE — 3008F BODY MASS INDEX DOCD: CPT | Mod: CPTII,S$GLB,, | Performed by: ALLERGY & IMMUNOLOGY

## 2020-10-15 PROCEDURE — 99999 PR PBB SHADOW E&M-EST. PATIENT-LVL IV: ICD-10-PCS | Mod: PBBFAC,,, | Performed by: ALLERGY & IMMUNOLOGY

## 2020-10-15 PROCEDURE — 90686 IIV4 VACC NO PRSV 0.5 ML IM: CPT | Mod: S$GLB,,, | Performed by: ALLERGY & IMMUNOLOGY

## 2020-10-15 PROCEDURE — 99214 OFFICE O/P EST MOD 30 MIN: CPT | Mod: 25,S$GLB,, | Performed by: ALLERGY & IMMUNOLOGY

## 2020-10-15 PROCEDURE — 3008F PR BODY MASS INDEX (BMI) DOCUMENTED: ICD-10-PCS | Mod: CPTII,S$GLB,, | Performed by: ALLERGY & IMMUNOLOGY

## 2020-10-15 PROCEDURE — 90686 FLU VACCINE (QUAD) GREATER THAN OR EQUAL TO 3YO PRESERVATIVE FREE IM: ICD-10-PCS | Mod: S$GLB,,, | Performed by: ALLERGY & IMMUNOLOGY

## 2020-10-15 PROCEDURE — 90471 IMMUNIZATION ADMIN: CPT | Mod: S$GLB,,, | Performed by: ALLERGY & IMMUNOLOGY

## 2020-10-15 PROCEDURE — 90471 FLU VACCINE (QUAD) GREATER THAN OR EQUAL TO 3YO PRESERVATIVE FREE IM: ICD-10-PCS | Mod: S$GLB,,, | Performed by: ALLERGY & IMMUNOLOGY

## 2020-10-15 NOTE — PROGRESS NOTES
"Subjective:       Patient ID: Pebbles Gonzalez is a 55 y.o. female.    Follow up visit, previously seen 9/9/2020    Chief Complaint:  Patient in for follow up on allergies and flu vaccine      HPI 9/9/2020: 55 year old female complaining of angioedema. Three days ago, she experienced tongue and right facial swelling. She was given Pepcid, Epinephrine and steroid. Tongue went down, but lips and the side of her face are swelling. The right side of her body swells as well.  She reports a history of swelling and itching of the body parts that usually occurs on the feet and arms but recently symptoms have started occurring on her face. She has been having these symptoms "throughout my life". Symptoms have worsened over the last month.  Child- Arimis cologne caused facial swelling; sulfites can cause swelling  Triggers- sulfites, "too much seafood", dust  She has an Epipen prescription.  She has not had to use one in ten years.  Mom and brother- angioedema.  Mom- pepsi- tongue swelling  Brother-pepefren, . Pepper and some fruits causes tongue swelling    She reports runny nose, sneezing, itchy and watery eyes in the Fall.She takes Flonase daily helps allergic rhinitis. Dust exacerbates nasal and eye symptoms. She reports allergy testing over 20 years ago. She was told that she was allergic to dust, cockroach and pet dander. She denies sinus surgery. She reports infrequent sinus infections.    No asthma symptoms over the last 10 years.  She is not using the inhaler in the chart.  She is not taking mobic or aspirin.    Reviewed [facility] notes- C1 esterase level normal      HPI today: 55 year old female here for follow up. She reports doing well, since starting on H1 and H2 blockers daily. She is taking Pepcid 20 mgand Xyzal 5 mg. She denies throat or tongue swelling. She has not needed her Epipen.  No ER visit. She stopped the Sherry juice. She has not "done a good job of keeping a diary".  She feels Flonase " helps nasal symptoms.      Past Medical History:   Diagnosis Date    Allergy     seasonal    Asthma     adult onset    GERD (gastroesophageal reflux disease)     gi- dr coffman    History of mammogram 12/30/2016    Hypercholesteremia      Family History   Problem Relation Age of Onset    Prostate cancer Father     Ovarian cancer Maternal Aunt     Ovarian cancer Maternal Aunt     Ovarian cancer Maternal Aunt     Ovarian cancer Maternal Aunt      Medications:  Reviewed.    Review of patient's allergies indicates:   Allergen Reactions    Sulfa (sulfonamide antibiotics) Swelling and Anaphylaxis    Ace inhibitors      History of angioedema    Latex, natural rubber Other (See Comments)     Skin burns       Environmental History: No pets. Not smoker.  Review of Systems   Constitutional: Negative for chills and fever.   HENT: Negative for congestion and rhinorrhea.    Eyes: Negative for discharge and itching.   Respiratory: Negative for cough, shortness of breath and wheezing.    Cardiovascular: Negative for chest pain and leg swelling.   Gastrointestinal: Negative for nausea and vomiting.   Musculoskeletal: Negative for arthralgias and gait problem.   Skin: Negative for rash and wound.   Allergic/Immunologic: Positive for environmental allergies. Negative for food allergies.   Neurological: Negative for facial asymmetry and speech difficulty.   Hematological: Negative for adenopathy. Does not bruise/bleed easily.   Psychiatric/Behavioral: Negative for behavioral problems and suicidal ideas.        Objective:    Physical Exam  Vitals signs reviewed.   Constitutional:       General: She is not in acute distress.     Appearance: She is well-developed. She is obese. She is not ill-appearing, toxic-appearing or diaphoretic.   HENT:      Head: Normocephalic and atraumatic.      Right Ear: Tympanic membrane, ear canal and external ear normal. There is no impacted cerumen.      Left Ear: Tympanic membrane, ear canal  and external ear normal. There is no impacted cerumen.      Nose: Nose normal. No congestion or rhinorrhea.      Mouth/Throat:      Pharynx: No oropharyngeal exudate or posterior oropharyngeal erythema.   Eyes:      General: No scleral icterus.        Right eye: No discharge.         Left eye: No discharge.      Pupils: Pupils are equal, round, and reactive to light.   Neck:      Musculoskeletal: Normal range of motion and neck supple.      Thyroid: No thyromegaly.   Cardiovascular:      Rate and Rhythm: Normal rate and regular rhythm.      Heart sounds: Normal heart sounds. No murmur. No friction rub. No gallop.    Pulmonary:      Effort: Pulmonary effort is normal. No respiratory distress.      Breath sounds: Normal breath sounds. No stridor. No wheezing, rhonchi or rales.   Abdominal:      General: Bowel sounds are normal. There is no distension.      Palpations: Abdomen is soft. There is no mass.      Tenderness: There is no abdominal tenderness. There is no guarding.      Hernia: No hernia is present.   Musculoskeletal: Normal range of motion.         General: No swelling, tenderness, deformity or signs of injury.      Right lower leg: No edema.      Left lower leg: No edema.   Lymphadenopathy:      Cervical: No cervical adenopathy.   Skin:     General: Skin is warm.      Coloration: Skin is not jaundiced or pale.      Findings: No bruising, erythema or lesion.   Neurological:      General: No focal deficit present.      Mental Status: She is alert and oriented to person, place, and time.      Gait: Gait normal.   Psychiatric:         Mood and Affect: Mood normal.         Behavior: Behavior normal.         Thought Content: Thought content normal.         Judgment: Judgment normal.           Assessment:       1. Allergic angioedema, sequela    2. Allergic rhinitis due to American house dust mite    3. Influenza vaccine administered         Plan:       Allergic angioedema, sequela    Allergic rhinitis due to  American house dust mite    Influenza vaccine administered  -     Influenza - Quadrivalent *Preferred* (6 months+) (PF)          Recommend avoidance of NSAIDs(motrin, mobic, ibuprofen, naproxen, etc).  Recommend avoidance of any items known to cause symptoms- sulfites, etc.  Recommend that she carry an Epipen 2 pack with her at all times.  Recommend she continue Pepcid and Xyzal daily . Epipen 2 pack with her at all times.  Keep a diary of foods and any medications taken.  If diagnosed with HTN in the future avoid ACE inhibitors. ACE inhibitors listed as an allergy.  RTC 4 months or sooner, if needed.    MD,JUAN R

## 2020-10-15 NOTE — PATIENT INSTRUCTIONS
Dust mites are microscopic insect-like pests that live in house dust. They feed on dead skin or dander that are shed by people and pets.They can worsen asthma and allergies. Dust mites live in mattresses, bedding, upholstered furniture, carpets, and curtains in your home. No matter how clean a home is, dust mites cannot be completely eliminated. A number of things can be done to reduce the number of dust mites:  -Use a dehumidifier or air conditioner to maintain humidity levels at, or below, 50 percent.  -Encase mattress and pillows in dust mite- proof or allergen impermeable covers.  -Wash all bedding and blankets once a week in hot water, 130 to 140 degrees Fahrenheit, to kill dust mites. Non- washable bedding can be frozen overnight.  -Replace wool or feathered bedding products with synthetic materials, and traditional stuffed animals with washable ones.  -In bedrooms, replace wall to wall carpeting with bare floors, and remove fabric curtains and upholstered furniture, whenever possible.  -Use a damp mop or rag to remove dust. Never use a dry cloth, as it stirs up allergens.  -Use a double-layered microfilter bag or a HEPA filter in your vacuum .  -Wear a mask while vacuuming, and stay out of the vacuuming area for 20 minutes after vacuuming, to allow dust and allergens to settle.    Recommend avoidance of NSAIDs(motrin, mobic, ibuprofen, naproxen, etc).  Recommend avoidance of any items known to cause symptoms- sulfites, etc.  Recommend that she carry an Epipen 2 pack with her at all times.  Recommend Pepcid and Xyzal daily . Epipen 2 pack with her at all times.  Keep a diary of foods and any medications taken.  If diagnosed with HTN in the future avoid ACE inhibitors. ACE inhibitors listed as an allergy.

## 2021-02-24 ENCOUNTER — LAB VISIT (OUTPATIENT)
Dept: LAB | Facility: HOSPITAL | Age: 56
End: 2021-02-24
Attending: ALLERGY & IMMUNOLOGY
Payer: COMMERCIAL

## 2021-02-24 ENCOUNTER — OFFICE VISIT (OUTPATIENT)
Dept: ALLERGY | Facility: CLINIC | Age: 56
End: 2021-02-24
Payer: COMMERCIAL

## 2021-02-24 VITALS
DIASTOLIC BLOOD PRESSURE: 77 MMHG | SYSTOLIC BLOOD PRESSURE: 133 MMHG | BODY MASS INDEX: 41.95 KG/M2 | TEMPERATURE: 98 F | HEIGHT: 70 IN | WEIGHT: 293 LBS | HEART RATE: 73 BPM

## 2021-02-24 DIAGNOSIS — E66.01 MORBID OBESITY WITH BMI OF 40.0-44.9, ADULT: ICD-10-CM

## 2021-02-24 DIAGNOSIS — T78.3XXS ANGIOEDEMA, SEQUELA: ICD-10-CM

## 2021-02-24 DIAGNOSIS — Z91.018 FOOD ALLERGY: Primary | ICD-10-CM

## 2021-02-24 DIAGNOSIS — J30.89 ALLERGIC RHINITIS DUE TO AMERICAN HOUSE DUST MITE: ICD-10-CM

## 2021-02-24 LAB
BASOPHILS # BLD AUTO: 0.03 K/UL (ref 0–0.2)
BASOPHILS NFR BLD: 0.4 % (ref 0–1.9)
DIFFERENTIAL METHOD: ABNORMAL
EOSINOPHIL # BLD AUTO: 0.2 K/UL (ref 0–0.5)
EOSINOPHIL NFR BLD: 2 % (ref 0–8)
ERYTHROCYTE [DISTWIDTH] IN BLOOD BY AUTOMATED COUNT: 16.5 % (ref 11.5–14.5)
ESTIMATED AVG GLUCOSE: 131 MG/DL (ref 68–131)
HBA1C MFR BLD: 6.2 % (ref 4–5.6)
HCT VFR BLD AUTO: 42.1 % (ref 37–48.5)
HGB BLD-MCNC: 13 G/DL (ref 12–16)
IMM GRANULOCYTES # BLD AUTO: 0.02 K/UL (ref 0–0.04)
IMM GRANULOCYTES NFR BLD AUTO: 0.2 % (ref 0–0.5)
LYMPHOCYTES # BLD AUTO: 2.5 K/UL (ref 1–4.8)
LYMPHOCYTES NFR BLD: 30.8 % (ref 18–48)
MCH RBC QN AUTO: 28 PG (ref 27–31)
MCHC RBC AUTO-ENTMCNC: 30.9 G/DL (ref 32–36)
MCV RBC AUTO: 91 FL (ref 82–98)
MONOCYTES # BLD AUTO: 0.6 K/UL (ref 0.3–1)
MONOCYTES NFR BLD: 7.4 % (ref 4–15)
NEUTROPHILS # BLD AUTO: 4.8 K/UL (ref 1.8–7.7)
NEUTROPHILS NFR BLD: 59.2 % (ref 38–73)
NRBC BLD-RTO: 0 /100 WBC
PLATELET # BLD AUTO: 333 K/UL (ref 150–350)
PMV BLD AUTO: 11.2 FL (ref 9.2–12.9)
RBC # BLD AUTO: 4.64 M/UL (ref 4–5.4)
WBC # BLD AUTO: 8.16 K/UL (ref 3.9–12.7)

## 2021-02-24 PROCEDURE — 83036 HEMOGLOBIN GLYCOSYLATED A1C: CPT

## 2021-02-24 PROCEDURE — 85025 COMPLETE CBC W/AUTO DIFF WBC: CPT

## 2021-02-24 PROCEDURE — 80061 LIPID PANEL: CPT

## 2021-02-24 PROCEDURE — 99214 OFFICE O/P EST MOD 30 MIN: CPT | Mod: S$GLB,,, | Performed by: ALLERGY & IMMUNOLOGY

## 2021-02-24 PROCEDURE — 36415 COLL VENOUS BLD VENIPUNCTURE: CPT

## 2021-02-24 PROCEDURE — 3008F PR BODY MASS INDEX (BMI) DOCUMENTED: ICD-10-PCS | Mod: CPTII,S$GLB,, | Performed by: ALLERGY & IMMUNOLOGY

## 2021-02-24 PROCEDURE — 99999 PR PBB SHADOW E&M-EST. PATIENT-LVL IV: ICD-10-PCS | Mod: PBBFAC,,, | Performed by: ALLERGY & IMMUNOLOGY

## 2021-02-24 PROCEDURE — 3008F BODY MASS INDEX DOCD: CPT | Mod: CPTII,S$GLB,, | Performed by: ALLERGY & IMMUNOLOGY

## 2021-02-24 PROCEDURE — 99999 PR PBB SHADOW E&M-EST. PATIENT-LVL IV: CPT | Mod: PBBFAC,,, | Performed by: ALLERGY & IMMUNOLOGY

## 2021-02-24 PROCEDURE — 80053 COMPREHEN METABOLIC PANEL: CPT

## 2021-02-24 PROCEDURE — 99214 PR OFFICE/OUTPT VISIT, EST, LEVL IV, 30-39 MIN: ICD-10-PCS | Mod: S$GLB,,, | Performed by: ALLERGY & IMMUNOLOGY

## 2021-02-25 LAB
ALBUMIN SERPL BCP-MCNC: 3.6 G/DL (ref 3.5–5.2)
ALP SERPL-CCNC: 118 U/L (ref 55–135)
ALT SERPL W/O P-5'-P-CCNC: 10 U/L (ref 10–44)
ANION GAP SERPL CALC-SCNC: 9 MMOL/L (ref 8–16)
AST SERPL-CCNC: 12 U/L (ref 10–40)
BILIRUB SERPL-MCNC: 0.3 MG/DL (ref 0.1–1)
BUN SERPL-MCNC: 5 MG/DL (ref 6–20)
CALCIUM SERPL-MCNC: 8.8 MG/DL (ref 8.7–10.5)
CHLORIDE SERPL-SCNC: 106 MMOL/L (ref 95–110)
CHOLEST SERPL-MCNC: 213 MG/DL (ref 120–199)
CHOLEST/HDLC SERPL: 4.6 {RATIO} (ref 2–5)
CO2 SERPL-SCNC: 26 MMOL/L (ref 23–29)
CREAT SERPL-MCNC: 0.8 MG/DL (ref 0.5–1.4)
EST. GFR  (AFRICAN AMERICAN): >60 ML/MIN/1.73 M^2
EST. GFR  (NON AFRICAN AMERICAN): >60 ML/MIN/1.73 M^2
GLUCOSE SERPL-MCNC: 92 MG/DL (ref 70–110)
HDLC SERPL-MCNC: 46 MG/DL (ref 40–75)
HDLC SERPL: 21.6 % (ref 20–50)
LDLC SERPL CALC-MCNC: 141.4 MG/DL (ref 63–159)
NONHDLC SERPL-MCNC: 167 MG/DL
POTASSIUM SERPL-SCNC: 4.3 MMOL/L (ref 3.5–5.1)
PROT SERPL-MCNC: 7.4 G/DL (ref 6–8.4)
SODIUM SERPL-SCNC: 141 MMOL/L (ref 136–145)
TRIGL SERPL-MCNC: 128 MG/DL (ref 30–150)

## 2021-03-06 ENCOUNTER — LAB VISIT (OUTPATIENT)
Dept: LAB | Facility: HOSPITAL | Age: 56
End: 2021-03-06
Attending: FAMILY MEDICINE
Payer: COMMERCIAL

## 2021-03-06 DIAGNOSIS — R31.9 HEMATURIA, UNSPECIFIED TYPE: ICD-10-CM

## 2021-03-06 LAB
BILIRUB UR QL STRIP: NEGATIVE
CLARITY UR: CLEAR
COLOR UR: YELLOW
GLUCOSE UR QL STRIP: NEGATIVE
HGB UR QL STRIP: ABNORMAL
KETONES UR QL STRIP: NEGATIVE
LEUKOCYTE ESTERASE UR QL STRIP: NEGATIVE
NITRITE UR QL STRIP: NEGATIVE
PH UR STRIP: 6 [PH] (ref 5–8)
PROT UR QL STRIP: NEGATIVE
SP GR UR STRIP: 1.02 (ref 1–1.03)
URN SPEC COLLECT METH UR: ABNORMAL
UROBILINOGEN UR STRIP-ACNC: NEGATIVE EU/DL

## 2021-03-06 PROCEDURE — 81003 URINALYSIS AUTO W/O SCOPE: CPT | Performed by: FAMILY MEDICINE

## 2021-03-08 ENCOUNTER — PATIENT MESSAGE (OUTPATIENT)
Dept: INTERNAL MEDICINE | Facility: CLINIC | Age: 56
End: 2021-03-08

## 2021-03-08 DIAGNOSIS — R31.9 HEMATURIA, UNSPECIFIED TYPE: Primary | ICD-10-CM

## 2021-03-10 ENCOUNTER — PATIENT MESSAGE (OUTPATIENT)
Dept: INTERNAL MEDICINE | Facility: CLINIC | Age: 56
End: 2021-03-10

## 2021-03-11 ENCOUNTER — OFFICE VISIT (OUTPATIENT)
Dept: INTERNAL MEDICINE | Facility: CLINIC | Age: 56
End: 2021-03-11
Payer: COMMERCIAL

## 2021-03-11 VITALS
OXYGEN SATURATION: 97 % | BODY MASS INDEX: 41.95 KG/M2 | HEIGHT: 70 IN | HEART RATE: 83 BPM | DIASTOLIC BLOOD PRESSURE: 78 MMHG | TEMPERATURE: 98 F | WEIGHT: 293 LBS | SYSTOLIC BLOOD PRESSURE: 128 MMHG

## 2021-03-11 DIAGNOSIS — Z12.31 ENCOUNTER FOR SCREENING MAMMOGRAM FOR MALIGNANT NEOPLASM OF BREAST: ICD-10-CM

## 2021-03-11 DIAGNOSIS — Z00.00 ROUTINE HEALTH MAINTENANCE: Primary | ICD-10-CM

## 2021-03-11 DIAGNOSIS — E66.01 MORBID OBESITY: ICD-10-CM

## 2021-03-11 DIAGNOSIS — R73.02 IGT (IMPAIRED GLUCOSE TOLERANCE): ICD-10-CM

## 2021-03-11 DIAGNOSIS — E78.00 HYPERCHOLESTEREMIA: ICD-10-CM

## 2021-03-11 PROCEDURE — 1125F AMNT PAIN NOTED PAIN PRSNT: CPT | Mod: S$GLB,,, | Performed by: FAMILY MEDICINE

## 2021-03-11 PROCEDURE — 99396 PR PREVENTIVE VISIT,EST,40-64: ICD-10-PCS | Mod: S$GLB,,, | Performed by: FAMILY MEDICINE

## 2021-03-11 PROCEDURE — 99999 PR PBB SHADOW E&M-EST. PATIENT-LVL IV: CPT | Mod: PBBFAC,,, | Performed by: FAMILY MEDICINE

## 2021-03-11 PROCEDURE — 3008F PR BODY MASS INDEX (BMI) DOCUMENTED: ICD-10-PCS | Mod: CPTII,S$GLB,, | Performed by: FAMILY MEDICINE

## 2021-03-11 PROCEDURE — 1125F PR PAIN SEVERITY QUANTIFIED, PAIN PRESENT: ICD-10-PCS | Mod: S$GLB,,, | Performed by: FAMILY MEDICINE

## 2021-03-11 PROCEDURE — 99999 PR PBB SHADOW E&M-EST. PATIENT-LVL IV: ICD-10-PCS | Mod: PBBFAC,,, | Performed by: FAMILY MEDICINE

## 2021-03-11 PROCEDURE — 99396 PREV VISIT EST AGE 40-64: CPT | Mod: S$GLB,,, | Performed by: FAMILY MEDICINE

## 2021-03-11 PROCEDURE — 3008F BODY MASS INDEX DOCD: CPT | Mod: CPTII,S$GLB,, | Performed by: FAMILY MEDICINE

## 2021-04-28 ENCOUNTER — PATIENT MESSAGE (OUTPATIENT)
Dept: RESEARCH | Facility: HOSPITAL | Age: 56
End: 2021-04-28

## 2021-06-09 ENCOUNTER — LAB VISIT (OUTPATIENT)
Dept: LAB | Facility: HOSPITAL | Age: 56
End: 2021-06-09
Payer: COMMERCIAL

## 2021-06-09 DIAGNOSIS — R31.9 HEMATURIA, UNSPECIFIED TYPE: ICD-10-CM

## 2021-06-09 LAB
BILIRUB UR QL STRIP: NEGATIVE
CLARITY UR: ABNORMAL
COLOR UR: YELLOW
GLUCOSE UR QL STRIP: NEGATIVE
HGB UR QL STRIP: NEGATIVE
KETONES UR QL STRIP: NEGATIVE
LEUKOCYTE ESTERASE UR QL STRIP: NEGATIVE
NITRITE UR QL STRIP: NEGATIVE
PH UR STRIP: 7 [PH] (ref 5–8)
PROT UR QL STRIP: NEGATIVE
SP GR UR STRIP: 1.01 (ref 1–1.03)
URN SPEC COLLECT METH UR: ABNORMAL

## 2021-06-09 PROCEDURE — 81003 URINALYSIS AUTO W/O SCOPE: CPT | Performed by: FAMILY MEDICINE

## 2021-06-19 ENCOUNTER — PATIENT MESSAGE (OUTPATIENT)
Dept: INTERNAL MEDICINE | Facility: CLINIC | Age: 56
End: 2021-06-19

## 2021-07-15 RX ORDER — LINACLOTIDE 145 UG/1
CAPSULE, GELATIN COATED ORAL
Qty: 30 CAPSULE | Refills: 11 | Status: SHIPPED | OUTPATIENT
Start: 2021-07-15 | End: 2022-08-04

## 2021-07-15 RX ORDER — LINACLOTIDE 145 UG/1
CAPSULE, GELATIN COATED ORAL
Qty: 90 CAPSULE | Refills: 3 | Status: SHIPPED | OUTPATIENT
Start: 2021-07-15 | End: 2021-07-15

## 2021-08-17 ENCOUNTER — PATIENT OUTREACH (OUTPATIENT)
Dept: ADMINISTRATIVE | Facility: HOSPITAL | Age: 56
End: 2021-08-17

## 2021-09-14 ENCOUNTER — LAB VISIT (OUTPATIENT)
Dept: LAB | Facility: HOSPITAL | Age: 56
End: 2021-09-14
Attending: FAMILY MEDICINE
Payer: COMMERCIAL

## 2021-09-14 DIAGNOSIS — R73.02 IGT (IMPAIRED GLUCOSE TOLERANCE): ICD-10-CM

## 2021-09-14 LAB — GLUCOSE SERPL-MCNC: 91 MG/DL (ref 70–110)

## 2021-09-14 PROCEDURE — 83036 HEMOGLOBIN GLYCOSYLATED A1C: CPT | Performed by: FAMILY MEDICINE

## 2021-09-14 PROCEDURE — 36415 COLL VENOUS BLD VENIPUNCTURE: CPT | Performed by: FAMILY MEDICINE

## 2021-09-14 PROCEDURE — 82947 ASSAY GLUCOSE BLOOD QUANT: CPT | Performed by: FAMILY MEDICINE

## 2021-09-15 LAB
ESTIMATED AVG GLUCOSE: 126 MG/DL (ref 68–131)
HBA1C MFR BLD: 6 % (ref 4–5.6)

## 2021-10-12 ENCOUNTER — IMMUNIZATION (OUTPATIENT)
Dept: INTERNAL MEDICINE | Facility: CLINIC | Age: 56
End: 2021-10-12
Payer: COMMERCIAL

## 2021-10-12 PROCEDURE — 90686 IIV4 VACC NO PRSV 0.5 ML IM: CPT | Mod: S$GLB,,, | Performed by: FAMILY MEDICINE

## 2021-10-12 PROCEDURE — 90686 FLU VACCINE (QUAD) GREATER THAN OR EQUAL TO 3YO PRESERVATIVE FREE IM: ICD-10-PCS | Mod: S$GLB,,, | Performed by: FAMILY MEDICINE

## 2021-10-12 PROCEDURE — 90471 FLU VACCINE (QUAD) GREATER THAN OR EQUAL TO 3YO PRESERVATIVE FREE IM: ICD-10-PCS | Mod: S$GLB,,, | Performed by: FAMILY MEDICINE

## 2021-10-12 PROCEDURE — 90471 IMMUNIZATION ADMIN: CPT | Mod: S$GLB,,, | Performed by: FAMILY MEDICINE

## 2021-10-26 ENCOUNTER — IMMUNIZATION (OUTPATIENT)
Dept: PHARMACY | Facility: CLINIC | Age: 56
End: 2021-10-26
Payer: COMMERCIAL

## 2021-10-26 DIAGNOSIS — Z23 NEED FOR VACCINATION: Primary | ICD-10-CM

## 2022-03-08 ENCOUNTER — LAB VISIT (OUTPATIENT)
Dept: LAB | Facility: HOSPITAL | Age: 57
End: 2022-03-08
Payer: COMMERCIAL

## 2022-03-08 DIAGNOSIS — R73.02 IGT (IMPAIRED GLUCOSE TOLERANCE): ICD-10-CM

## 2022-03-08 LAB
ESTIMATED AVG GLUCOSE: 120 MG/DL (ref 68–131)
HBA1C MFR BLD: 5.8 % (ref 4–5.6)

## 2022-03-08 PROCEDURE — 83036 HEMOGLOBIN GLYCOSYLATED A1C: CPT | Performed by: FAMILY MEDICINE

## 2022-03-08 PROCEDURE — 36415 COLL VENOUS BLD VENIPUNCTURE: CPT | Performed by: FAMILY MEDICINE

## 2022-03-15 ENCOUNTER — OFFICE VISIT (OUTPATIENT)
Dept: INTERNAL MEDICINE | Facility: CLINIC | Age: 57
End: 2022-03-15
Payer: COMMERCIAL

## 2022-03-15 ENCOUNTER — HOSPITAL ENCOUNTER (OUTPATIENT)
Dept: CARDIOLOGY | Facility: HOSPITAL | Age: 57
Discharge: HOME | End: 2022-03-15
Attending: FAMILY MEDICINE
Payer: COMMERCIAL

## 2022-03-15 ENCOUNTER — IMMUNIZATION (OUTPATIENT)
Dept: PHARMACY | Facility: CLINIC | Age: 57
End: 2022-03-15
Payer: COMMERCIAL

## 2022-03-15 ENCOUNTER — HOSPITAL ENCOUNTER (OUTPATIENT)
Dept: RADIOLOGY | Facility: HOSPITAL | Age: 57
Discharge: HOME | End: 2022-03-15
Attending: FAMILY MEDICINE
Payer: COMMERCIAL

## 2022-03-15 ENCOUNTER — PATIENT MESSAGE (OUTPATIENT)
Dept: PHARMACY | Facility: CLINIC | Age: 57
End: 2022-03-15
Payer: COMMERCIAL

## 2022-03-15 ENCOUNTER — TELEPHONE (OUTPATIENT)
Dept: CARDIOLOGY | Facility: HOSPITAL | Age: 57
End: 2022-03-15
Payer: COMMERCIAL

## 2022-03-15 VITALS
HEART RATE: 80 BPM | HEIGHT: 70 IN | WEIGHT: 293 LBS | OXYGEN SATURATION: 98 % | DIASTOLIC BLOOD PRESSURE: 70 MMHG | TEMPERATURE: 97 F | SYSTOLIC BLOOD PRESSURE: 122 MMHG | BODY MASS INDEX: 41.95 KG/M2

## 2022-03-15 DIAGNOSIS — E78.00 HYPERCHOLESTEREMIA: ICD-10-CM

## 2022-03-15 DIAGNOSIS — M54.50 LOW BACK PAIN WITHOUT SCIATICA, UNSPECIFIED BACK PAIN LATERALITY, UNSPECIFIED CHRONICITY: ICD-10-CM

## 2022-03-15 DIAGNOSIS — R06.09 DOE (DYSPNEA ON EXERTION): ICD-10-CM

## 2022-03-15 DIAGNOSIS — J45.20 MILD INTERMITTENT ASTHMA WITHOUT COMPLICATION: ICD-10-CM

## 2022-03-15 DIAGNOSIS — R73.02 IGT (IMPAIRED GLUCOSE TOLERANCE): ICD-10-CM

## 2022-03-15 DIAGNOSIS — Z84.0 FAMILY HISTORY OF ANGIOEDEMA: ICD-10-CM

## 2022-03-15 DIAGNOSIS — E66.01 MORBID OBESITY: ICD-10-CM

## 2022-03-15 DIAGNOSIS — Z00.00 ROUTINE HEALTH MAINTENANCE: Primary | ICD-10-CM

## 2022-03-15 LAB
ALBUMIN SERPL BCP-MCNC: 3.3 G/DL (ref 3.5–5.2)
ALP SERPL-CCNC: 112 U/L (ref 55–135)
ALT SERPL W/O P-5'-P-CCNC: 9 U/L (ref 10–44)
ANION GAP SERPL CALC-SCNC: 9 MMOL/L (ref 8–16)
AST SERPL-CCNC: 11 U/L (ref 10–40)
BASOPHILS # BLD AUTO: 0.04 K/UL (ref 0–0.2)
BASOPHILS NFR BLD: 0.5 % (ref 0–1.9)
BILIRUB SERPL-MCNC: 0.2 MG/DL (ref 0.1–1)
BUN SERPL-MCNC: 8 MG/DL (ref 6–20)
CALCIUM SERPL-MCNC: 9.4 MG/DL (ref 8.7–10.5)
CHLORIDE SERPL-SCNC: 107 MMOL/L (ref 95–110)
CHOLEST SERPL-MCNC: 208 MG/DL (ref 120–199)
CHOLEST/HDLC SERPL: 5 {RATIO} (ref 2–5)
CO2 SERPL-SCNC: 27 MMOL/L (ref 23–29)
CREAT SERPL-MCNC: 0.7 MG/DL (ref 0.5–1.4)
DIFFERENTIAL METHOD: ABNORMAL
EOSINOPHIL # BLD AUTO: 0.2 K/UL (ref 0–0.5)
EOSINOPHIL NFR BLD: 2 % (ref 0–8)
ERYTHROCYTE [DISTWIDTH] IN BLOOD BY AUTOMATED COUNT: 15.5 % (ref 11.5–14.5)
EST. GFR  (AFRICAN AMERICAN): >60 ML/MIN/1.73 M^2
EST. GFR  (NON AFRICAN AMERICAN): >60 ML/MIN/1.73 M^2
GLUCOSE SERPL-MCNC: 98 MG/DL (ref 70–110)
HCT VFR BLD AUTO: 42.4 % (ref 37–48.5)
HDLC SERPL-MCNC: 42 MG/DL (ref 40–75)
HDLC SERPL: 20.2 % (ref 20–50)
HGB BLD-MCNC: 13.1 G/DL (ref 12–16)
IMM GRANULOCYTES # BLD AUTO: 0.03 K/UL (ref 0–0.04)
IMM GRANULOCYTES NFR BLD AUTO: 0.4 % (ref 0–0.5)
LDLC SERPL CALC-MCNC: 139.8 MG/DL (ref 63–159)
LYMPHOCYTES # BLD AUTO: 3.5 K/UL (ref 1–4.8)
LYMPHOCYTES NFR BLD: 46.3 % (ref 18–48)
MCH RBC QN AUTO: 28.3 PG (ref 27–31)
MCHC RBC AUTO-ENTMCNC: 30.9 G/DL (ref 32–36)
MCV RBC AUTO: 92 FL (ref 82–98)
MONOCYTES # BLD AUTO: 0.7 K/UL (ref 0.3–1)
MONOCYTES NFR BLD: 8.7 % (ref 4–15)
NEUTROPHILS # BLD AUTO: 3.2 K/UL (ref 1.8–7.7)
NEUTROPHILS NFR BLD: 42.1 % (ref 38–73)
NONHDLC SERPL-MCNC: 166 MG/DL
NRBC BLD-RTO: 0 /100 WBC
PLATELET # BLD AUTO: 339 K/UL (ref 150–450)
PMV BLD AUTO: 10.5 FL (ref 9.2–12.9)
POTASSIUM SERPL-SCNC: 4 MMOL/L (ref 3.5–5.1)
PROT SERPL-MCNC: 7.2 G/DL (ref 6–8.4)
RBC # BLD AUTO: 4.63 M/UL (ref 4–5.4)
SODIUM SERPL-SCNC: 143 MMOL/L (ref 136–145)
TRIGL SERPL-MCNC: 131 MG/DL (ref 30–150)
TSH SERPL DL<=0.005 MIU/L-ACNC: 0.75 UIU/ML (ref 0.4–4)
WBC # BLD AUTO: 7.62 K/UL (ref 3.9–12.7)

## 2022-03-15 PROCEDURE — 93010 EKG 12-LEAD: ICD-10-PCS | Mod: ,,, | Performed by: INTERNAL MEDICINE

## 2022-03-15 PROCEDURE — 1159F PR MEDICATION LIST DOCUMENTED IN MEDICAL RECORD: ICD-10-PCS | Mod: CPTII,S$GLB,, | Performed by: FAMILY MEDICINE

## 2022-03-15 PROCEDURE — 93005 ELECTROCARDIOGRAM TRACING: CPT

## 2022-03-15 PROCEDURE — 3078F PR MOST RECENT DIASTOLIC BLOOD PRESSURE < 80 MM HG: ICD-10-PCS | Mod: CPTII,S$GLB,, | Performed by: FAMILY MEDICINE

## 2022-03-15 PROCEDURE — 99396 PREV VISIT EST AGE 40-64: CPT | Mod: S$GLB,,, | Performed by: FAMILY MEDICINE

## 2022-03-15 PROCEDURE — 84443 ASSAY THYROID STIM HORMONE: CPT | Performed by: FAMILY MEDICINE

## 2022-03-15 PROCEDURE — 1159F MED LIST DOCD IN RCRD: CPT | Mod: CPTII,S$GLB,, | Performed by: FAMILY MEDICINE

## 2022-03-15 PROCEDURE — 99999 PR PBB SHADOW E&M-EST. PATIENT-LVL V: ICD-10-PCS | Mod: PBBFAC,,, | Performed by: FAMILY MEDICINE

## 2022-03-15 PROCEDURE — 3044F PR MOST RECENT HEMOGLOBIN A1C LEVEL <7.0%: ICD-10-PCS | Mod: CPTII,S$GLB,, | Performed by: FAMILY MEDICINE

## 2022-03-15 PROCEDURE — 3078F DIAST BP <80 MM HG: CPT | Mod: CPTII,S$GLB,, | Performed by: FAMILY MEDICINE

## 2022-03-15 PROCEDURE — 93010 ELECTROCARDIOGRAM REPORT: CPT | Mod: ,,, | Performed by: INTERNAL MEDICINE

## 2022-03-15 PROCEDURE — 71046 X-RAY EXAM CHEST 2 VIEWS: CPT | Mod: TC

## 2022-03-15 PROCEDURE — 3074F SYST BP LT 130 MM HG: CPT | Mod: CPTII,S$GLB,, | Performed by: FAMILY MEDICINE

## 2022-03-15 PROCEDURE — 3008F PR BODY MASS INDEX (BMI) DOCUMENTED: ICD-10-PCS | Mod: CPTII,S$GLB,, | Performed by: FAMILY MEDICINE

## 2022-03-15 PROCEDURE — 3044F HG A1C LEVEL LT 7.0%: CPT | Mod: CPTII,S$GLB,, | Performed by: FAMILY MEDICINE

## 2022-03-15 PROCEDURE — 85025 COMPLETE CBC W/AUTO DIFF WBC: CPT | Performed by: FAMILY MEDICINE

## 2022-03-15 PROCEDURE — 3074F PR MOST RECENT SYSTOLIC BLOOD PRESSURE < 130 MM HG: ICD-10-PCS | Mod: CPTII,S$GLB,, | Performed by: FAMILY MEDICINE

## 2022-03-15 PROCEDURE — 99999 PR PBB SHADOW E&M-EST. PATIENT-LVL V: CPT | Mod: PBBFAC,,, | Performed by: FAMILY MEDICINE

## 2022-03-15 PROCEDURE — 71046 X-RAY EXAM CHEST 2 VIEWS: CPT | Mod: 26,,, | Performed by: RADIOLOGY

## 2022-03-15 PROCEDURE — 3008F BODY MASS INDEX DOCD: CPT | Mod: CPTII,S$GLB,, | Performed by: FAMILY MEDICINE

## 2022-03-15 PROCEDURE — 80061 LIPID PANEL: CPT | Performed by: FAMILY MEDICINE

## 2022-03-15 PROCEDURE — 99396 PR PREVENTIVE VISIT,EST,40-64: ICD-10-PCS | Mod: S$GLB,,, | Performed by: FAMILY MEDICINE

## 2022-03-15 PROCEDURE — 80053 COMPREHEN METABOLIC PANEL: CPT | Performed by: FAMILY MEDICINE

## 2022-03-15 PROCEDURE — 71046 XR CHEST PA AND LATERAL: ICD-10-PCS | Mod: 26,,, | Performed by: RADIOLOGY

## 2022-03-15 RX ORDER — LEVOCETIRIZINE DIHYDROCHLORIDE 5 MG/1
5 TABLET, FILM COATED ORAL NIGHTLY
Qty: 30 TABLET | Refills: 11 | Status: SHIPPED | OUTPATIENT
Start: 2022-03-15 | End: 2023-03-15

## 2022-03-22 ENCOUNTER — TELEPHONE (OUTPATIENT)
Dept: CARDIOLOGY | Facility: HOSPITAL | Age: 57
End: 2022-03-22
Payer: COMMERCIAL

## 2022-04-04 ENCOUNTER — PATIENT MESSAGE (OUTPATIENT)
Dept: INTERNAL MEDICINE | Facility: CLINIC | Age: 57
End: 2022-04-04
Payer: COMMERCIAL

## 2022-04-20 ENCOUNTER — HOSPITAL ENCOUNTER (OUTPATIENT)
Dept: CARDIOLOGY | Facility: HOSPITAL | Age: 57
Discharge: HOME | End: 2022-04-20
Attending: FAMILY MEDICINE
Payer: COMMERCIAL

## 2022-04-20 ENCOUNTER — TELEPHONE (OUTPATIENT)
Dept: CARDIOLOGY | Facility: HOSPITAL | Age: 57
End: 2022-04-20
Payer: COMMERCIAL

## 2022-04-20 VITALS
WEIGHT: 293 LBS | BODY MASS INDEX: 41.95 KG/M2 | HEIGHT: 70 IN | HEART RATE: 65 BPM | DIASTOLIC BLOOD PRESSURE: 71 MMHG | SYSTOLIC BLOOD PRESSURE: 107 MMHG

## 2022-04-20 DIAGNOSIS — R06.09 DOE (DYSPNEA ON EXERTION): ICD-10-CM

## 2022-04-20 LAB
AORTIC ROOT ANNULUS: 2.74 CM
ASCENDING AORTA: 2.57 CM
AV INDEX (PROSTH): 0.78
AV MEAN GRADIENT: 5 MMHG
AV PEAK GRADIENT: 9 MMHG
AV VALVE AREA: 2.43 CM2
AV VELOCITY RATIO: 0.78
BSA FOR ECHO PROCEDURE: 2.57 M2
CV ECHO LV RWT: 0.32 CM
CV STRESS BASE HR: 65 BPM
DIASTOLIC BLOOD PRESSURE: 71 MMHG
DOP CALC AO PEAK VEL: 1.53 M/S
DOP CALC AO VTI: 33.8 CM
DOP CALC LVOT AREA: 3.1 CM2
DOP CALC LVOT DIAMETER: 2 CM
DOP CALC LVOT PEAK VEL: 1.2 M/S
DOP CALC LVOT STROKE VOLUME: 82.27 CM3
DOP CALC RVOT PEAK VEL: 0.7 M/S
DOP CALC RVOT VTI: 14.5 CM
DOP CALCLVOT PEAK VEL VTI: 26.2 CM
E WAVE DECELERATION TIME: 217.93 MSEC
E/A RATIO: 0.8
E/E' RATIO: 8.12 M/S
ECHO EF ESTIMATED: 72 %
ECHO LV POSTERIOR WALL: 0.8 CM (ref 0.6–1.1)
EJECTION FRACTION: 65 %
FRACTIONAL SHORTENING: 42 % (ref 28–44)
INTERVENTRICULAR SEPTUM: 1.09 CM (ref 0.6–1.1)
IVRT: 94.2 MSEC
LA MAJOR: 4.9 CM
LA MINOR: 4.4 CM
LA WIDTH: 3.4 CM
LEFT ATRIUM SIZE: 3.76 CM
LEFT ATRIUM VOLUME INDEX MOD: 12.5 ML/M2
LEFT ATRIUM VOLUME INDEX: 20.4 ML/M2
LEFT ATRIUM VOLUME MOD: 30.85 CM3
LEFT ATRIUM VOLUME: 50.38 CM3
LEFT INTERNAL DIMENSION IN SYSTOLE: 2.91 CM (ref 2.1–4)
LEFT VENTRICLE DIASTOLIC VOLUME INDEX: 47.46 ML/M2
LEFT VENTRICLE DIASTOLIC VOLUME: 117.22 ML
LEFT VENTRICLE MASS INDEX: 68 G/M2
LEFT VENTRICLE SYSTOLIC VOLUME INDEX: 13.1 ML/M2
LEFT VENTRICLE SYSTOLIC VOLUME: 32.35 ML
LEFT VENTRICULAR INTERNAL DIMENSION IN DIASTOLE: 4.98 CM (ref 3.5–6)
LEFT VENTRICULAR MASS: 167.61 G
LV LATERAL E/E' RATIO: 6.9 M/S
LV SEPTAL E/E' RATIO: 9.86 M/S
LVOT MG: 3.2 MMHG
LVOT MV: 0.84 CM/S
MV PEAK A VEL: 0.86 M/S
MV PEAK E VEL: 0.69 M/S
MV STENOSIS PRESSURE HALF TIME: 63.2 MS
MV VALVE AREA P 1/2 METHOD: 3.48 CM2
OHS CV CPX 1 MINUTE RECOVERY HEART RATE: 120 BPM
OHS CV CPX 85 PERCENT MAX PREDICTED HEART RATE MALE: 132
OHS CV CPX ESTIMATED METS: 7
OHS CV CPX MAX PREDICTED HEART RATE: 155
OHS CV CPX PATIENT IS FEMALE: 1
OHS CV CPX PATIENT IS MALE: 0
OHS CV CPX PEAK DIASTOLIC BLOOD PRESSURE: 65 MMHG
OHS CV CPX PEAK HEAR RATE: 157 BPM
OHS CV CPX PEAK RATE PRESSURE PRODUCT: NORMAL
OHS CV CPX PEAK SYSTOLIC BLOOD PRESSURE: 176 MMHG
OHS CV CPX PERCENT MAX PREDICTED HEART RATE ACHIEVED: 101
OHS CV CPX RATE PRESSURE PRODUCT PRESENTING: 6955
PISA TR MAX VEL: 2.61 M/S
PULM VEIN S/D RATIO: 1.28
PV MEAN GRADIENT: 1.06 MMHG
PV PEAK D VEL: 0.36 M/S
PV PEAK S VEL: 0.46 M/S
PV PEAK VELOCITY: 0.84 CM/S
RA MAJOR: 5 CM
RA PRESSURE: 3 MMHG
RA WIDTH: 3.2 CM
RIGHT VENTRICULAR END-DIASTOLIC DIMENSION: 2.97 CM
STJ: 2.24 CM
STRESS ECHO POST EXERCISE DUR MIN: 4 MINUTES
STRESS ECHO POST EXERCISE DUR SEC: 16 SECONDS
SYSTOLIC BLOOD PRESSURE: 107 MMHG
TDI LATERAL: 0.1 M/S
TDI SEPTAL: 0.07 M/S
TDI: 0.09 M/S
TR MAX PG: 27 MMHG
TV REST PULMONARY ARTERY PRESSURE: 30 MMHG

## 2022-04-20 PROCEDURE — 93351 STRESS TTE COMPLETE: CPT

## 2022-04-20 PROCEDURE — 93351 STRESS TTE COMPLETE: CPT | Mod: 26,,, | Performed by: INTERNAL MEDICINE

## 2022-04-20 PROCEDURE — 93351 STRESS ECHO (CUPID ONLY): ICD-10-PCS | Mod: 26,,, | Performed by: INTERNAL MEDICINE

## 2022-04-25 DIAGNOSIS — R22.0 RIGHT FACIAL SWELLING: ICD-10-CM

## 2022-04-25 DIAGNOSIS — Z84.0 FAMILY HISTORY OF ANGIOEDEMA: ICD-10-CM

## 2022-04-25 RX ORDER — EPINEPHRINE 0.3 MG/.3ML
INJECTION SUBCUTANEOUS
Qty: 2 EACH | Refills: 1 | Status: SHIPPED | OUTPATIENT
Start: 2022-04-25 | End: 2023-03-20

## 2022-04-25 NOTE — TELEPHONE ENCOUNTER
Refill Routing Note   Medication(s) are not appropriate for processing by [facility] for the following reason(s):      - Outside of protocol    [facility] action(s):                   Appointments  past 12m or future 3m with PCP    Date Provider   Last Visit   3/15/2022 Javed Adorno MD   Next Visit   Visit date not found Javed Adorno MD   ED visits in past 90 days: 0        Note composed:8:33 AM 04/25/2022

## 2022-09-06 ENCOUNTER — PATIENT MESSAGE (OUTPATIENT)
Dept: INTERNAL MEDICINE | Facility: CLINIC | Age: 57
End: 2022-09-06
Payer: COMMERCIAL

## 2022-09-06 ENCOUNTER — TELEPHONE (OUTPATIENT)
Dept: INTERNAL MEDICINE | Facility: CLINIC | Age: 57
End: 2022-09-06
Payer: COMMERCIAL

## 2022-09-06 DIAGNOSIS — Z12.31 ENCOUNTER FOR SCREENING MAMMOGRAM FOR MALIGNANT NEOPLASM OF BREAST: Primary | ICD-10-CM

## 2022-09-06 NOTE — TELEPHONE ENCOUNTER
Pt is requesting order for mammogram to be faxed to [facility] at [phone]. Mammogram ordered. Ordered faxed.//ddw

## 2022-09-12 LAB — BCS RECOMMENDATION EXT: NORMAL

## 2022-09-28 ENCOUNTER — IMMUNIZATION (OUTPATIENT)
Dept: PHARMACY | Facility: CLINIC | Age: 57
End: 2022-09-28
Payer: COMMERCIAL

## 2022-09-28 DIAGNOSIS — Z23 NEED FOR VACCINATION: Primary | ICD-10-CM

## 2022-10-04 ENCOUNTER — PATIENT MESSAGE (OUTPATIENT)
Dept: ADMINISTRATIVE | Facility: HOSPITAL | Age: 57
End: 2022-10-04
Payer: COMMERCIAL

## 2022-10-04 ENCOUNTER — PATIENT OUTREACH (OUTPATIENT)
Dept: ADMINISTRATIVE | Facility: HOSPITAL | Age: 57
End: 2022-10-04
Payer: COMMERCIAL

## 2022-10-04 NOTE — LETTER
October 4, 2022    [facility]   Medical Records             [facility]  [address]  [facility] [address]  Phone: [phone] October 4, 2022     Patient: Pebbles Gonzalez    YOB: 1965   Date of Visit: 10/4/2022       To whom it may concern       We are seeing Pebbles Lozano Carlos, LEANNA.O.B is 1965, at [facility]. Javed Adorno MD is their primary care physician. To help with our [facility] records could you please send the following:     Mammogram    Please send fax to [phone], Attention Alicia HANSON LPN Care Coordination.    Thank-you in advance for your assistance. If you have any questions or concerns, please don't hesitate to contact me at [phone].     Alicia HANSON LPN   Care Coordination Department  [facility]

## 2022-11-07 ENCOUNTER — IMMUNIZATION (OUTPATIENT)
Dept: INTERNAL MEDICINE | Facility: CLINIC | Age: 57
End: 2022-11-07
Payer: COMMERCIAL

## 2022-11-07 PROCEDURE — 90686 FLU VACCINE (QUAD) GREATER THAN OR EQUAL TO 3YO PRESERVATIVE FREE IM: ICD-10-PCS | Mod: S$GLB,,, | Performed by: FAMILY MEDICINE

## 2022-11-07 PROCEDURE — 90686 IIV4 VACC NO PRSV 0.5 ML IM: CPT | Mod: S$GLB,,, | Performed by: FAMILY MEDICINE

## 2022-11-07 PROCEDURE — 90471 FLU VACCINE (QUAD) GREATER THAN OR EQUAL TO 3YO PRESERVATIVE FREE IM: ICD-10-PCS | Mod: S$GLB,,, | Performed by: FAMILY MEDICINE

## 2022-11-07 PROCEDURE — 90471 IMMUNIZATION ADMIN: CPT | Mod: S$GLB,,, | Performed by: FAMILY MEDICINE

## 2023-03-15 ENCOUNTER — LAB VISIT (OUTPATIENT)
Dept: LAB | Facility: HOSPITAL | Age: 58
End: 2023-03-15
Attending: FAMILY MEDICINE
Payer: COMMERCIAL

## 2023-03-15 DIAGNOSIS — Z00.00 ROUTINE HEALTH MAINTENANCE: ICD-10-CM

## 2023-03-15 DIAGNOSIS — R73.02 IGT (IMPAIRED GLUCOSE TOLERANCE): ICD-10-CM

## 2023-03-15 LAB
ALBUMIN SERPL BCP-MCNC: 3.4 G/DL (ref 3.5–5.2)
ALP SERPL-CCNC: 125 U/L (ref 55–135)
ALT SERPL W/O P-5'-P-CCNC: 8 U/L (ref 10–44)
ANION GAP SERPL CALC-SCNC: 10 MMOL/L (ref 8–16)
AST SERPL-CCNC: 12 U/L (ref 10–40)
BILIRUB SERPL-MCNC: 0.3 MG/DL (ref 0.1–1)
BUN SERPL-MCNC: 8 MG/DL (ref 6–20)
CALCIUM SERPL-MCNC: 9.4 MG/DL (ref 8.7–10.5)
CHLORIDE SERPL-SCNC: 107 MMOL/L (ref 95–110)
CHOLEST SERPL-MCNC: 223 MG/DL (ref 120–199)
CHOLEST/HDLC SERPL: 4.6 {RATIO} (ref 2–5)
CO2 SERPL-SCNC: 26 MMOL/L (ref 23–29)
CREAT SERPL-MCNC: 0.8 MG/DL (ref 0.5–1.4)
EST. GFR  (NO RACE VARIABLE): >60 ML/MIN/1.73 M^2
ESTIMATED AVG GLUCOSE: 126 MG/DL (ref 68–131)
GLUCOSE SERPL-MCNC: 112 MG/DL (ref 70–110)
HBA1C MFR BLD: 6 % (ref 4–5.6)
HDLC SERPL-MCNC: 49 MG/DL (ref 40–75)
HDLC SERPL: 22 % (ref 20–50)
LDLC SERPL CALC-MCNC: 153 MG/DL (ref 63–159)
NONHDLC SERPL-MCNC: 174 MG/DL
POTASSIUM SERPL-SCNC: 4.1 MMOL/L (ref 3.5–5.1)
PROT SERPL-MCNC: 7.4 G/DL (ref 6–8.4)
SODIUM SERPL-SCNC: 143 MMOL/L (ref 136–145)
TRIGL SERPL-MCNC: 105 MG/DL (ref 30–150)
TSH SERPL DL<=0.005 MIU/L-ACNC: 1.68 UIU/ML (ref 0.4–4)

## 2023-03-15 PROCEDURE — 84443 ASSAY THYROID STIM HORMONE: CPT | Performed by: FAMILY MEDICINE

## 2023-03-15 PROCEDURE — 36415 COLL VENOUS BLD VENIPUNCTURE: CPT | Performed by: FAMILY MEDICINE

## 2023-03-15 PROCEDURE — 80053 COMPREHEN METABOLIC PANEL: CPT | Performed by: FAMILY MEDICINE

## 2023-03-15 PROCEDURE — 80061 LIPID PANEL: CPT | Performed by: FAMILY MEDICINE

## 2023-03-15 PROCEDURE — 83036 HEMOGLOBIN GLYCOSYLATED A1C: CPT | Performed by: FAMILY MEDICINE

## 2023-03-20 ENCOUNTER — OFFICE VISIT (OUTPATIENT)
Dept: INTERNAL MEDICINE | Facility: CLINIC | Age: 58
End: 2023-03-20
Payer: COMMERCIAL

## 2023-03-20 VITALS
HEIGHT: 70 IN | OXYGEN SATURATION: 99 % | BODY MASS INDEX: 41.41 KG/M2 | WEIGHT: 289.25 LBS | SYSTOLIC BLOOD PRESSURE: 102 MMHG | DIASTOLIC BLOOD PRESSURE: 64 MMHG | TEMPERATURE: 97 F | HEART RATE: 67 BPM

## 2023-03-20 DIAGNOSIS — E78.00 HYPERCHOLESTEREMIA: ICD-10-CM

## 2023-03-20 DIAGNOSIS — M54.50 LOW BACK PAIN WITHOUT SCIATICA, UNSPECIFIED BACK PAIN LATERALITY, UNSPECIFIED CHRONICITY: ICD-10-CM

## 2023-03-20 DIAGNOSIS — R73.03 PREDIABETES: ICD-10-CM

## 2023-03-20 DIAGNOSIS — Z00.00 ROUTINE HEALTH MAINTENANCE: Primary | ICD-10-CM

## 2023-03-20 DIAGNOSIS — E66.01 MORBID OBESITY: ICD-10-CM

## 2023-03-20 PROCEDURE — 99999 PR PBB SHADOW E&M-EST. PATIENT-LVL IV: CPT | Mod: PBBFAC,,, | Performed by: FAMILY MEDICINE

## 2023-03-20 PROCEDURE — 3008F BODY MASS INDEX DOCD: CPT | Mod: CPTII,S$GLB,, | Performed by: FAMILY MEDICINE

## 2023-03-20 PROCEDURE — 1159F PR MEDICATION LIST DOCUMENTED IN MEDICAL RECORD: ICD-10-PCS | Mod: CPTII,S$GLB,, | Performed by: FAMILY MEDICINE

## 2023-03-20 PROCEDURE — 99999 PR PBB SHADOW E&M-EST. PATIENT-LVL IV: ICD-10-PCS | Mod: PBBFAC,,, | Performed by: FAMILY MEDICINE

## 2023-03-20 PROCEDURE — 3008F PR BODY MASS INDEX (BMI) DOCUMENTED: ICD-10-PCS | Mod: CPTII,S$GLB,, | Performed by: FAMILY MEDICINE

## 2023-03-20 PROCEDURE — 3078F DIAST BP <80 MM HG: CPT | Mod: CPTII,S$GLB,, | Performed by: FAMILY MEDICINE

## 2023-03-20 PROCEDURE — 3074F PR MOST RECENT SYSTOLIC BLOOD PRESSURE < 130 MM HG: ICD-10-PCS | Mod: CPTII,S$GLB,, | Performed by: FAMILY MEDICINE

## 2023-03-20 PROCEDURE — 3044F PR MOST RECENT HEMOGLOBIN A1C LEVEL <7.0%: ICD-10-PCS | Mod: CPTII,S$GLB,, | Performed by: FAMILY MEDICINE

## 2023-03-20 PROCEDURE — 3074F SYST BP LT 130 MM HG: CPT | Mod: CPTII,S$GLB,, | Performed by: FAMILY MEDICINE

## 2023-03-20 PROCEDURE — 3078F PR MOST RECENT DIASTOLIC BLOOD PRESSURE < 80 MM HG: ICD-10-PCS | Mod: CPTII,S$GLB,, | Performed by: FAMILY MEDICINE

## 2023-03-20 PROCEDURE — 99396 PR PREVENTIVE VISIT,EST,40-64: ICD-10-PCS | Mod: S$GLB,,, | Performed by: FAMILY MEDICINE

## 2023-03-20 PROCEDURE — 3044F HG A1C LEVEL LT 7.0%: CPT | Mod: CPTII,S$GLB,, | Performed by: FAMILY MEDICINE

## 2023-03-20 PROCEDURE — 99396 PREV VISIT EST AGE 40-64: CPT | Mod: S$GLB,,, | Performed by: FAMILY MEDICINE

## 2023-03-20 PROCEDURE — 1159F MED LIST DOCD IN RCRD: CPT | Mod: CPTII,S$GLB,, | Performed by: FAMILY MEDICINE

## 2023-03-20 NOTE — PROGRESS NOTES
Subjective:       Patient ID: Pebbles Gonzalez is a . female.    Chief Complaint: here for physical examination and issues  below    HPIp  GERD . Tolerating medication. On ppi still w reflux;stretta procedu hx   Morbid obesity : we discussed need for  weight loss via health diet/portion control and if able then  Exercise;d/wd bariatric surg;she has lost 7lbs since last visit. Steps and eating well.   Asthma asympt.a    hyperchol cv risk 3% prev w healthier diet /daught vegan          prediab a1c 6;d/wd diet wt loss and  metform but defers for now;d/wd [facility]    Angioedema  hx dr puentes          Past Medical History:   Diagnosis Date    Allergy     seasonal    Asthma     adult onset    GERD (gastroesophageal reflux disease)     gi- dr coffman    History of mammogram 12/30/2016    Hypercholesteremia    igt  Past Surgical History:   Procedure Laterality Date    CHOLECYSTECTOMY      KNEE ARTHROSCOPY W/ PARTIAL MEDIAL MENISCECTOMY      stretta      TOTAL ABDOMINAL HYSTERECTOMY  2000     Family History   Problem Relation Age of Onset    Prostate cancer Father     Ovarian cancer Maternal Aunt     Ovarian cancer Maternal Aunt     Ovarian cancer Maternal Aunt     Ovarian cancer Maternal Aunt      Social History     Socioeconomic History    Marital status:      Spouse name: Not on file    Number of children: Not on file    Years of education: Not on file    Highest education level: Not on file   Occupational History     Employer: other   Social Needs    Financial resource strain: Not on file    Food insecurity:     Worry: Not on file     Inability: Not on file    Transportation needs:     Medical: Not on file     Non-medical: Not on file   Tobacco Use    Smoking status: Never Smoker    Smokeless tobacco: Never Used   Substance and Sexual Activity    Alcohol use: No     Alcohol/week: 0.0 standard drinks    Drug use: No    Sexual activity: Yes     Partners: Male     [facility]   Lifestyle    Physical  activity:     Days per week: Not on file     Minutes per session: Not on file    Stress: Not on file   Relationships    Social connections:     Talks on phone: Not on file     Gets together: Not on file     Attends Orthodoxy service: Not on file     Active member of club or organization: Not on file     Attends meetings of clubs or organizations: Not on file     Relationship status: Not on file   Other Topics Concern    Not on file   Social History Narrative    Matthias passed away 3/3/20         Review of Systems  Cardiovascular: no chest pain  Chest: no shortness of breath  Abd: no abd pain  Remainder review of systems negative    Objective:      Physical Exam   Constitutional: She is oriented to person, place, and time. She appears well-developed and well-nourished. No distress.   HENT:   Head: Atraumatic.   Right Ear: External ear normal.   Left Ear: External ear normal.   Nose: Nose normal.     bilat tms nl   Eyes: Conjunctivae and EOM are normal. Pupils are equal, round, and reactive to light. No scleral icterus.   Neck: Normal range of motion. Neck supple. No thyromegaly present.   Cardiovascular: Normal rate, regular rhythm and normal heart sounds.    No murmur heard.  Pulmonary/Chest: Effort normal and breath sounds normal. No respiratory distress. She has no wheezes. She has no rales.   Abdominal: Soft. Bowel sounds are normal. She exhibits no distension and no mass. There is no hepatosplenomegaly. There is no tenderness. There is no rebound and no guarding.   Musculoskeletal: Normal range of motion. She exhibits no edema; llower  paralumb area.pts to l/sacral area . No mass bilat lower ext 5/5  Lymphadenopathy:     She has no cervical adenopathy.   Neurological: She is alert and oriented to person, place, and time. No cranial nerve deficit. She exhibits normal muscle tone. Coordination normal.   Skin: Skin is warm. No rash noted. No erythema. No pallor.   Psychiatric: She has a normal mood and affect. Her  behavior is normal. Judgment and thought content normal.   Nursing note and vitals reviewed.      Assessment:       1. Routine health maintenance      morbid obesity  Asthma  hyperchol  prediab  Plan:       [facility]  Wt loss exerc      Lab 12 months and follow up after           Add: also wants to resume aquatic therapy at [facility] she did last summer helped with intermitt back pain    1. Routine health maintenance  -     Comprehensive Metabolic Panel; Future; Expected date: 03/19/2024    2. Prediabetes  -     Hemoglobin A1C; Future; Expected date: 03/20/2024  -     Ambulatory referral/consult to [facility]; Future; Expected date: 03/27/2023    3. Hypercholesteremia  -     Lipid Panel; Future; Expected date: 03/19/2024    4. Morbid obesity    5. Low back pain without sciatica, unspecified back pain laterality, unspecified chronicity  -     Ambulatory referral/consult to Physical/Occupational Therapy; Future; Expected date: 03/27/2023

## 2023-04-10 ENCOUNTER — PATIENT MESSAGE (OUTPATIENT)
Dept: PRIMARY CARE CLINIC | Facility: CLINIC | Age: 58
End: 2023-04-10
Payer: COMMERCIAL

## 2024-01-25 ENCOUNTER — TELEPHONE (OUTPATIENT)
Dept: INTERNAL MEDICINE | Facility: CLINIC | Age: 59
End: 2024-01-25
Payer: COMMERCIAL

## 2024-01-25 DIAGNOSIS — Z12.31 ENCOUNTER FOR SCREENING MAMMOGRAM FOR MALIGNANT NEOPLASM OF BREAST: Primary | ICD-10-CM

## 2024-01-25 NOTE — TELEPHONE ENCOUNTER
----- Message from Jonelle Jeronimo sent at 1/25/2024  8:42 AM CST -----  Regarding: Mammo Order (External)  Contact: Pebbles  Type:  Mammogram    Caller is requesting to schedule their annual mammogram appointment.  Order is not listed in EPIC.  Please enter order and contact patient to schedule.  Name of Caller: Pebbles  Where would they like the mammogram performed? [facility]   Would the patient rather a call back or a response via [facility]? call  Best Call Back Number:  [phone]  Additional Information:  Pebbles requests her mammogram order be emailed to her:   [email or web address]

## 2024-01-25 NOTE — TELEPHONE ENCOUNTER
----- Message from Lalito Anthony sent at 1/25/2024 11:11 AM CST -----  Contact: Pebbles Rogel would like a call back at [phone] in regards to currently being at [facility] waiting for the order her mammogram to be sent. She spoke with a nurse earlier this morning who said they sent it but they don't have it, please refax to [phone].  Thanks   Am

## 2024-01-25 NOTE — TELEPHONE ENCOUNTER
Mammogram order placed. Fax to [facility].//ddw

## 2024-01-25 NOTE — TELEPHONE ENCOUNTER
----- Message from Cierra Penaloza sent at 1/25/2024 11:19 AM CST -----  Who Called: [facility] Nurse    What is the request in detail: Requesting call back to discuss sending order, pt is their already. Fax: [phone]. Please advise    Can the clinic reply by [facility]? No    Best Call Back Number: [phone]    Additional Information:

## 2024-09-18 DIAGNOSIS — R73.03 PREDIABETES: ICD-10-CM

## 2024-12-15 DIAGNOSIS — M25.571 PAIN IN JOINT INVOLVING RIGHT ANKLE AND FOOT: Primary | ICD-10-CM

## 2024-12-15 NOTE — PROGRESS NOTES
Dr. Judy Roberto      [address], [place] [address]   Phone [phone]  Fax [phone]           CHIEF COMPLAINT: Pain of the Right Foot and Pain of the Right Ankle       HISTORY OF PRESENT ILLNESS (12/16/2024):    Pebbles presents for follow-up regarding an ankle issue. She received an ankle shot in June, which provided some relief. She reports her current pain level as 6 out of 10 and states it is progressively worsening. She experiences pain with weight-bearing activities, located in the ankle area. Pebbles states that the pain occurs in a specific area when walking in a certain manner. Specialized shoes and a boot have helped, but pain persists, especially when walking at an angle or without proper support. Pebbles reports that while the shoes make a difference, pain occurs when walking at an angle or without proper support. The pain is more pronounced in the upper ankle area. She is preparing for an upcoming trip and anticipates needing to use the boot during travel.    Pebbles denies pain in the lower part of the foot or tarsus area.          PAST MEDICAL HISTORY:    Past Medical History:   Diagnosis Date    Allergy     seasonal    Angioedema     dr puentes    Asthma     adult onset    GERD (gastroesophageal reflux disease)     gi- dr coffman    History of mammogram 12/30/2016    Hypercholesteremia     IGT (impaired glucose tolerance)     IGT (impaired glucose tolerance)        Hemoglobin A1C   Date Value Ref Range Status   10/21/2024 6.0 % Final   03/15/2023 6.0 (H) 4.0 - 5.6 % Final     Comment:     ADA Screening Guidelines:  5.7-6.4%  Consistent with prediabetes  >or=6.5%  Consistent with diabetes    High levels of fetal hemoglobin interfere with the HbA1C  assay. Heterozygous hemoglobin variants (HbS, HgC, etc)do  not significantly interfere with this assay.   However, presence of multiple variants may affect accuracy.     03/08/2022 5.8 (H) 4.0 - 5.6 % Final     Comment:     ADA  Screening Guidelines:  5.7-6.4%  Consistent with prediabetes  >or=6.5%  Consistent with diabetes    High levels of fetal hemoglobin interfere with the HbA1C  assay. Heterozygous hemoglobin variants (HbS, HgC, etc)do  not significantly interfere with this assay.   However, presence of multiple variants may affect accuracy.     09/14/2021 6.0 (H) 4.0 - 5.6 % Final     Comment:     ADA Screening Guidelines:  5.7-6.4%  Consistent with prediabetes  >or=6.5%  Consistent with diabetes    High levels of fetal hemoglobin interfere with the HbA1C  assay. Heterozygous hemoglobin variants (HbS, HgC, etc)do  not significantly interfere with this assay.   However, presence of multiple variants may affect accuracy.          PAST SURGICAL HISTORY:    Past Surgical History:   Procedure Laterality Date    CHOLECYSTECTOMY      EYE SURGERY  5/15/2000    FRACTURE SURGERY  Torn meniscus    2019    HERNIA REPAIR  06/1970    KNEE ARTHROSCOPY W/ PARTIAL MEDIAL MENISCECTOMY      stretta      TOTAL ABDOMINAL HYSTERECTOMY  2000        MEDICATIONS:    Current Outpatient Medications:     fluticasone (FLONASE) 50 mcg/actuation nasal spray, 1 spray., Disp: , Rfl:     LINZESS 145 mcg Cap capsule, TAKE 1 CAPSULE BY MOUTH EVERY DAY, Disp: 90 capsule, Rfl: 3    pantoprazole (PROTONIX) 40 MG tablet, TAKE 1 TABLET BY MOUTH EVERY DAY, Disp: 90 tablet, Rfl: 3    RESTASIS MULTIDOSE 0.05 % Drop, Place 1 drop into both eyes 2 (two) times daily., Disp: , Rfl:     levocetirizine (XYZAL) 5 MG tablet, Take 1 tablet (5 mg total) by mouth every evening., Disp: 30 tablet, Rfl: 11    PREVIDENT 5000 BOOSTER PLUS 1.1 % Pste, USE TO BRUSH TEETH AT BEDTIME DO NOT RINSE AFTER, Disp: , Rfl: 1     There are no discontinued medications.      ALLERGIES:     Review of patient's allergies indicates:   Allergen Reactions    Sulfa (sulfonamide antibiotics) Swelling and Anaphylaxis    Sulfite Anaphylaxis    Ace inhibitors      History of angioedema    Latex, natural rubber Other  "(See Comments)     Skin burns    Nsaids (non-steroidal anti-inflammatory drug)      History of angioedema- not specific to NSAIDS, but can not definitively exclude    Adhesive tape-silicones Rash            FAMILY HISTORY:   Family History   Problem Relation Name Age of Onset    Prostate cancer Father Leonard Lozano     Cancer Father Leonard Lozano         Prostatic    Vision loss Father Leonard Lozano         Glaucoma    Ovarian cancer Maternal Aunt      Ovarian cancer Maternal Aunt      Ovarian cancer Maternal Aunt      Ovarian cancer Maternal Aunt      Arthritis Mother Theresa Mcallister            SOCIAL HISTORY:    Social History     Socioeconomic History    Marital status:    Occupational History     Employer: other   Tobacco Use    Smoking status: Never    Smokeless tobacco: Never   Substance and Sexual Activity    Alcohol use: No    Drug use: No    Sexual activity: Not Currently     Partners: Male     Birth control/protection: Abstinence, None     Comment: [facility]   Social History Narrative    Matthias passed away 3/3/20         PHYSICAL EXAMINATION:     Estimated body mass index is 41.47 kg/m² as calculated from the following:    Height as of this encounter: 5' 10" (1.778 m).    Weight as of this encounter: 131.1 kg (289 lb).   ASSISTIVE DEVICE:  Hoka shoes    MUSCULOSKELETAL:    Physical Exam    Musculoskeletal: Flat-footed.   She has pain with ankle range of motion.  She has medial and lateral gutter tenderness.  She has pain over posterior tibial tendon.  She has ankle swelling. Sensation is intact to light touch in the saphenous, sural, deep peroneal, superficial peroneal and tibial nerve distributions. Extensor hallucis longus, flexor hallucis longus, tibialis anterior, and gastroc soleus are firing. Palpable DP and PT pulses.               IMAGING:      X-Ray Ankle Complete Right  Narrative: EXAMINATION:  XR FOOT COMPLETE 3 VIEW RIGHT; XR ANKLE COMPLETE 3 VIEW RIGHT    CLINICAL HISTORY:  right foot pain;. Pain in " right ankle and joints of right foot    TECHNIQUE:  AP, lateral, and oblique views of the right foot and ankle were performed.    COMPARISON:  None    FINDINGS:  Ankle mortise intact without fracture or dislocation.  Tibial talar joint space maintained.  Medial talar dome subcortical lucency present suggesting subcortical cystic change.    Moderate plantar calcaneal enthesophyte.  Hallux valgus deformity with 1st metatarsal median eminence prominence and 1st MTP joint degenerative changes.  Hammertoe deformities noted.    No focal soft tissue abnormality.  Impression: As above    Electronically signed by: Henrik Doan MD  Date:    12/16/2024  Time:    13:13  X-Ray Foot Complete Right  Narrative: EXAMINATION:  XR FOOT COMPLETE 3 VIEW RIGHT; XR ANKLE COMPLETE 3 VIEW RIGHT    CLINICAL HISTORY:  right foot pain;. Pain in right ankle and joints of right foot    TECHNIQUE:  AP, lateral, and oblique views of the right foot and ankle were performed.    COMPARISON:  None    FINDINGS:  Ankle mortise intact without fracture or dislocation.  Tibial talar joint space maintained.  Medial talar dome subcortical lucency present suggesting subcortical cystic change.    Moderate plantar calcaneal enthesophyte.  Hallux valgus deformity with 1st metatarsal median eminence prominence and 1st MTP joint degenerative changes.  Hammertoe deformities noted.    No focal soft tissue abnormality.  Impression: As above    Electronically signed by: Henrik Doan MD  Date:    12/16/2024  Time:    13:13           ASSESSMENT: 59 y.o. female  with:   Right ankle osteochondral lesion s/p intraarticular steroid injection 06/11/2024 with good results and again on 12/16/2024  Pes planovalgus with posterior tibial tendinopathy    PLAN:   Data:  I independently visualized and interpreted xray images today (see report above).  I reviewed available old/outside records.  Medications:   She declined steroid pack today as she is not big on them unless  she absolutely needs it  She has Meloxicam that she is taking for pain  DME and weight bearing status:   In the past she got Hoka shoes from [employer]  She has a CAM walker that she can use when her pain is really bad.  Injections:  She received a right tibiotalar steroid injection today  Advanced Imaging:  MRI to evaluate her posterior tibial tendon as well as her OCD lesion  Education:   I had a long discussion with the patient about the causes, treatments, and prognosis/natural history for ankle arthritis.  We discussed effective ways to improve symptoms as well as what types of activities may make the symptoms/prognosis worse. We discussed signs and symptoms and other reasons to return to clinic sooner.     Return to clinic:   After MRI  Imaging needed at next follow-up: None.  She may be a candidate for physical therapy depending on the results of the MRI        This note was generated with the assistance of ambient listening technology. Verbal consent was obtained by the patient and accompanying visitor(s) for the recording of patient appointment to facilitate this note. I attest to having reviewed and edited the generated note for accuracy, though some syntax or spelling errors may persist. Please contact the author of this note for any clarification.              Physician Signature: Judy Roberto M.D.       Official Website  Schedule An Appointment

## 2024-12-16 ENCOUNTER — OFFICE VISIT (OUTPATIENT)
Dept: ORTHOPEDICS | Facility: CLINIC | Age: 59
End: 2024-12-16
Payer: COMMERCIAL

## 2024-12-16 ENCOUNTER — HOSPITAL ENCOUNTER (OUTPATIENT)
Dept: RADIOLOGY | Facility: HOSPITAL | Age: 59
Discharge: HOME | End: 2024-12-16
Attending: ORTHOPAEDIC SURGERY
Payer: COMMERCIAL

## 2024-12-16 VITALS — WEIGHT: 289 LBS | HEIGHT: 70 IN | BODY MASS INDEX: 41.37 KG/M2

## 2024-12-16 DIAGNOSIS — M25.571 PAIN IN JOINT INVOLVING RIGHT ANKLE AND FOOT: ICD-10-CM

## 2024-12-16 DIAGNOSIS — M25.571 RIGHT ANKLE PAIN, UNSPECIFIED CHRONICITY: Primary | ICD-10-CM

## 2024-12-16 PROCEDURE — 3044F HG A1C LEVEL LT 7.0%: CPT | Mod: CPTII,S$GLB,, | Performed by: ORTHOPAEDIC SURGERY

## 2024-12-16 PROCEDURE — 20605 DRAIN/INJ JOINT/BURSA W/O US: CPT | Mod: RT,S$GLB,, | Performed by: ORTHOPAEDIC SURGERY

## 2024-12-16 PROCEDURE — 73630 X-RAY EXAM OF FOOT: CPT | Mod: 26,RT,, | Performed by: RADIOLOGY

## 2024-12-16 PROCEDURE — 73610 X-RAY EXAM OF ANKLE: CPT | Mod: TC,RT

## 2024-12-16 PROCEDURE — 73610 X-RAY EXAM OF ANKLE: CPT | Mod: 26,RT,, | Performed by: RADIOLOGY

## 2024-12-16 PROCEDURE — 99204 OFFICE O/P NEW MOD 45 MIN: CPT | Mod: 25,S$GLB,, | Performed by: ORTHOPAEDIC SURGERY

## 2024-12-16 PROCEDURE — 1159F MED LIST DOCD IN RCRD: CPT | Mod: CPTII,S$GLB,, | Performed by: ORTHOPAEDIC SURGERY

## 2024-12-16 PROCEDURE — 3008F BODY MASS INDEX DOCD: CPT | Mod: CPTII,S$GLB,, | Performed by: ORTHOPAEDIC SURGERY

## 2024-12-16 PROCEDURE — 73630 X-RAY EXAM OF FOOT: CPT | Mod: TC,RT

## 2024-12-16 PROCEDURE — 99999 PR PBB SHADOW E&M-EST. PATIENT-LVL IV: CPT | Mod: PBBFAC,,, | Performed by: ORTHOPAEDIC SURGERY

## 2024-12-16 RX ORDER — BETAMETHASONE SODIUM PHOSPHATE AND BETAMETHASONE ACETATE 3; 3 MG/ML; MG/ML
6 INJECTION, SUSPENSION INTRA-ARTICULAR; INTRALESIONAL; INTRAMUSCULAR; SOFT TISSUE
Status: DISCONTINUED | OUTPATIENT
Start: 2024-12-16 | End: 2024-12-16 | Stop reason: HOSPADM

## 2024-12-16 RX ADMIN — BETAMETHASONE SODIUM PHOSPHATE AND BETAMETHASONE ACETATE 6 MG: 3; 3 INJECTION, SUSPENSION INTRA-ARTICULAR; INTRALESIONAL; INTRAMUSCULAR; SOFT TISSUE at 01:12

## 2024-12-16 NOTE — PROCEDURES
Intermediate Joint Aspiration/Injection: R ankle    Date/Time: 12/16/2024 1:15 PM    Performed by: Judy Roberto MD  Authorized by: Judy Roberto MD    Consent Done?:  Yes (Verbal)  Site marked: The procedure site was marked    Timeout: Prior to procedure the correct patient, procedure, and site was verified      Location:  Ankle  Site:  R ankle  Ultrasonic Guidance for needle placement: No  Needle size:  22 G  Approach:  Anteromedial  Medications:  6 mg betamethasone acetate-betamethasone sodium phosphate 6 mg/mL  Patient tolerance:  Patient tolerated the procedure well with no immediate complications

## 2024-12-19 ENCOUNTER — HOSPITAL ENCOUNTER (OUTPATIENT)
Dept: RADIOLOGY | Facility: HOSPITAL | Age: 59
Discharge: HOME | End: 2024-12-19
Attending: ORTHOPAEDIC SURGERY
Payer: COMMERCIAL

## 2024-12-19 DIAGNOSIS — M25.571 RIGHT ANKLE PAIN, UNSPECIFIED CHRONICITY: ICD-10-CM

## 2024-12-19 PROCEDURE — 73721 MRI JNT OF LWR EXTRE W/O DYE: CPT | Mod: TC,PN,RT

## 2024-12-19 PROCEDURE — 73721 MRI JNT OF LWR EXTRE W/O DYE: CPT | Mod: 26,RT,, | Performed by: RADIOLOGY

## 2024-12-30 NOTE — PROGRESS NOTES
Dr. Judy Roberto      [address], [place] [address]   Phone [phone]  Fax [phone]           CHIEF COMPLAINT: Pain of the Right Foot and Pain of the Right Ankle      HISTORY OF PRESENT ILLNESS (01/02/2025):  Pebbles presents for follow-up regarding MRI results and regarding ankle pain. Her pain is 5/10 today. She previously received an injection in her ankle, which provided minimal relief. She reports pain in the front of the ankle and notes that it catches when walking in a certain way. The injection helped initially, but she took a trip and walked on the ankle, potentially exacerbating the condition. The relief from the injection lasted for approximately a day, suggesting that only the numbing medication was effective, while the steroid component did not provide significant benefit.    She underwent an MRI following a previous X-ray that showed a missing piece of cartilage. The MRI confirmed an abnormality called an osteochondral lesion. Pebbles is not currently taking any pain medications, including the previously prescribed meloxicam. She is using Hoka shoes as recommended. She also reports pain in the tendon area.    She expresses concern about the potential surgery, which would require six weeks of non-weight bearing recovery. She indicates that while the recovery period would be challenging, it is manageable, and her job would allow her to work from home during this time.    Pebbles denies taking any pain medications currently.    MEDICATIONS:  - Pebbles is not taking any pain medications, including the previously prescribed meloxicam           HISTORY OF PRESENT ILLNESS (12/16/2024):    Pebbles presents for follow-up regarding an ankle issue. She received an ankle shot in June, which provided some relief. She reports her current pain level as 6 out of 10 and states it is progressively worsening. She experiences pain with weight-bearing activities, located in the ankle area. Pebbles  states that the pain occurs in a specific area when walking in a certain manner. Specialized shoes and a boot have helped, but pain persists, especially when walking at an angle or without proper support. Pebbles reports that while the shoes make a difference, pain occurs when walking at an angle or without proper support. The pain is more pronounced in the upper ankle area. She is preparing for an upcoming trip and anticipates needing to use the boot during travel.    Pebbles denies pain in the lower part of the foot or tarsus area.          PAST MEDICAL HISTORY:    Past Medical History:   Diagnosis Date    Allergy     seasonal    Angioedema     dr puentes    Asthma     adult onset    GERD (gastroesophageal reflux disease)     gi- dr coffman    History of mammogram 12/30/2016    Hypercholesteremia     IGT (impaired glucose tolerance)     IGT (impaired glucose tolerance)        Hemoglobin A1C   Date Value Ref Range Status   10/21/2024 6.0 % Final   03/15/2023 6.0 (H) 4.0 - 5.6 % Final     Comment:     ADA Screening Guidelines:  5.7-6.4%  Consistent with prediabetes  >or=6.5%  Consistent with diabetes    High levels of fetal hemoglobin interfere with the HbA1C  assay. Heterozygous hemoglobin variants (HbS, HgC, etc)do  not significantly interfere with this assay.   However, presence of multiple variants may affect accuracy.     03/08/2022 5.8 (H) 4.0 - 5.6 % Final     Comment:     ADA Screening Guidelines:  5.7-6.4%  Consistent with prediabetes  >or=6.5%  Consistent with diabetes    High levels of fetal hemoglobin interfere with the HbA1C  assay. Heterozygous hemoglobin variants (HbS, HgC, etc)do  not significantly interfere with this assay.   However, presence of multiple variants may affect accuracy.     09/14/2021 6.0 (H) 4.0 - 5.6 % Final     Comment:     ADA Screening Guidelines:  5.7-6.4%  Consistent with prediabetes  >or=6.5%  Consistent with diabetes    High levels of fetal hemoglobin interfere with the  HbA1C  assay. Heterozygous hemoglobin variants (HbS, HgC, etc)do  not significantly interfere with this assay.   However, presence of multiple variants may affect accuracy.          PAST SURGICAL HISTORY:    Past Surgical History:   Procedure Laterality Date    CHOLECYSTECTOMY      EYE SURGERY  5/15/2000    FRACTURE SURGERY  Torn meniscus    2019    HERNIA REPAIR  06/1970    KNEE ARTHROSCOPY W/ PARTIAL MEDIAL MENISCECTOMY      stretta      TOTAL ABDOMINAL HYSTERECTOMY  2000        MEDICATIONS:    Current Outpatient Medications:     diphenhydrAMINE-acetaminophen (TYLENOL PM)  mg Tab, Take 1 tablet by mouth every morning., Disp: , Rfl:     EPINEPHrine (EPIPEN) 0.3 mg/0.3 mL AtIn, ADMINISTER INTO THIGH AS NEEDED FOR ALLERGIC REACTION, Disp: , Rfl:     fluticasone (FLONASE) 50 mcg/actuation nasal spray, 1 spray., Disp: , Rfl:     LINZESS 145 mcg Cap capsule, TAKE 1 CAPSULE BY MOUTH EVERY DAY, Disp: 90 capsule, Rfl: 3    pantoprazole (PROTONIX) 40 MG tablet, TAKE 1 TABLET BY MOUTH EVERY DAY, Disp: 90 tablet, Rfl: 3    RESTASIS MULTIDOSE 0.05 % Drop, Place 1 drop into both eyes 2 (two) times daily., Disp: , Rfl:     gabapentin (NEURONTIN) 100 MG capsule, Take 1 capsule 3 times a day by oral route. (Patient not taking: Reported on 1/2/2025), Disp: , Rfl:     levocetirizine (XYZAL) 5 MG tablet, Take 1 tablet (5 mg total) by mouth every evening., Disp: 30 tablet, Rfl: 11    methocarbamoL (ROBAXIN) 750 MG Tab, Take 1 tablet 3 times a day by oral route as needed for 30 days. (Patient not taking: Reported on 1/2/2025), Disp: , Rfl:     PREVIDENT 5000 BOOSTER PLUS 1.1 % Pste, USE TO BRUSH TEETH AT BEDTIME DO NOT RINSE AFTER, Disp: , Rfl: 1    tiZANidine 2 mg Cap, Take 1 capsule every 6 hours by oral route as needed. (Patient not taking: Reported on 1/2/2025), Disp: , Rfl:      There are no discontinued medications.      ALLERGIES:     Review of patient's allergies indicates:   Allergen Reactions    Sulfa (sulfonamide  "antibiotics) Swelling and Anaphylaxis    Sulfite Anaphylaxis    Ace inhibitors      History of angioedema    Latex, natural rubber Other (See Comments)     Skin burns    Nsaids (non-steroidal anti-inflammatory drug)      History of angioedema- not specific to NSAIDS, but can not definitively exclude    Adhesive tape-silicones Rash            FAMILY HISTORY:   Family History   Problem Relation Name Age of Onset    Arthritis Mother Theresa Mcallister     Prostate cancer Father Leonard Lozano     Cancer Father Leonard Lozano         Prostatic    Vision loss Father Leonard Lozano         Glaucoma    Ovarian cancer Maternal Aunt      Ovarian cancer Maternal Aunt      Ovarian cancer Maternal Aunt      Ovarian cancer Maternal Aunt      Thrombosis Neg Hx             SOCIAL HISTORY:    Social History     Socioeconomic History    Marital status:    Occupational History     Employer: other   Tobacco Use    Smoking status: Never    Smokeless tobacco: Never   Substance and Sexual Activity    Alcohol use: No    Drug use: No    Sexual activity: Not Currently     Partners: Male     Birth control/protection: Abstinence, None     Comment: [facility]   Social History Narrative    Matthias passed away 3/3/20         PHYSICAL EXAMINATION:     Estimated body mass index is 41.47 kg/m² as calculated from the following:    Height as of 12/16/24: 5' 10" (1.778 m).    Weight as of 12/16/24: 131.1 kg (289 lb).   ASSISTIVE DEVICE:  [employer] shoes    MUSCULOSKELETAL:    Physical Exam      Right lower extremity:  She has pain with ankle range of motion.  She has medial and lateral gutter tenderness.  The majority of her pain is over her medial gutter.  She has pain over posterior tibial tendon but this is to a lesser extent.  She has ankle swelling. Sensation is intact to light touch in the saphenous, sural, deep peroneal, superficial peroneal and tibial nerve distributions. Extensor hallucis longus, flexor hallucis longus, tibialis anterior, and gastroc soleus are " firing. Palpable DP and PT pulses.               IMAGING:      MRI Ankle Without Contrast Right  Narrative: EXAM:  MRI ANKLE WITHOUT CONTRAST RIGHT    CLINICAL HISTORY:  Right ankle pain.    TECHNIQUE:  Standard multiplanar pulse sequences without IV or intra-articular contrast.    COMPARISON: None.    FINDINGS:    Mild hindfoot valgus and pes planus.  Otherwise the bones of the ankle and hindfoot are intact.  Bones and midfoot and metatarsal bases are intact.    OCD lesion of the mid to posterior medial talar dome measures 6 x 4 mm.  Intact overlying subchondral plate with punctate subchondral cyst or edema.  Full-thickness chondral loss.  No evidence of unstable OCD fragment.  Adjacent focal full-thickness chondral loss medial tibial plafond.  No joint effusion or intra-articular loose body.  2 cm cyst of the subtalar joint and talonavicular joint effusion.  The tarsometatarsal joints.  Full-thickness chondral loss of the first TMT joint.  Moderate chondral thinning throughout the remaining TMT joints.    Intact, thin ATFL.  The calcaneofibular ligament and posterior talofibular ligament are normal.  Anterior and posterior inferior tibiofibular  ligaments are normal.  Normal deltoid and spring ligaments.  Bifurcate ligament and Lisfranc ligament are normal.    Normal extensor tendons.  Normal flexor tendons.  Normal peroneal tendons.  Minimal distal Achilles tendinosis with minimal intrasubstance delamination.    Soft tissue edema within the sinus tarsi.  15 mm focal fluid collection or possible ganglion cyst emanates from the dorsum of the medial talonavicular joint.  Tarsal tunnel normal.  Mild thickening the plantar fascia.  Moderate size plantar calcaneal enthesophyte.    Mild subcutaneous soft tissue edema bilateral ankle extending into the foot.  Impression:  OCD lesion medial talar dome with overlying full-thickness chondral loss and subchondral cysts.  Negative for unstable fragment bone marrow  edema.    Scattered degenerative changes with full-thickness chondral loss the first TMT joint.    Minimal Achilles tendinosis.    Moderate size plantar calcaneal enthesophyte.    Finalized on: 12/19/2024 2:29 PM By:  Ricardo Newton MD  [facility]# [id number]      2024-12-19 14:32:[id number]    [facility]           ASSESSMENT: 59 y.o. female  with:   Right ankle osteochondral lesion s/p intraarticular steroid injection 06/11/2024 with good results and again on 12/16/2024 with minimal results an MRI on 12/19/2024 demonstrating OCD lesion medial talar dome with overlying full-thickness chondral loss and subchondral cysts.  Negative for unstable fragment bone marrow edema.  Scattered degenerative changes with full-thickness chondral loss the first TMT joint. Minimal Achilles tendinosis. Moderate size plantar calcaneal enthesophyte.  Pes planovalgus with posterior tibial tendinopathy    PLAN:   Data:  I independently visualized and interpreted MRI images today  I reviewed available old/outside records.  Medications:   She declined steroid pack at her last visit as she is not big on them unless she absolutely needs it  She has Meloxicam that she is taking for pain  DME and weight bearing status:   She is wearing [employer] shoes today  She has a CAM walker that she can use when her pain is really bad.  She has a lace-up ankle brace  Physical therapy:  I think therapy could potentially help her posterior tibial tendon.  I do not think it will help the OCD lesion this was explained to Pebbles  Surgery:  I offered her an ankle arthroscopic surgery with a microfracture of an OCD lesion of her talus.  She is going to consider this.  I explained to her that she would have a 6 week period of no weight.  Education:   I had a long discussion with the patient about the causes, treatments, and prognosis/natural history for ankle arthritis.  We discussed effective ways to improve symptoms as well as what types of activities may make the symptoms/prognosis  worse. We discussed signs and symptoms and other reasons to return to clinic sooner.     Return to clinic:   After MRI  Imaging needed at next follow-up: None.  She may be a candidate for physical therapy depending on the results of the MRI        This note was generated with the assistance of ambient listening technology. Verbal consent was obtained by the patient and accompanying visitor(s) for the recording of patient appointment to facilitate this note. I attest to having reviewed and edited the generated note for accuracy, though some syntax or spelling errors may persist. Please contact the author of this note for any clarification.              Physician Signature: Judy Roberto M.D.       Official Website  Schedule An Appointment

## 2025-01-02 ENCOUNTER — OFFICE VISIT (OUTPATIENT)
Dept: ORTHOPEDICS | Facility: CLINIC | Age: 60
End: 2025-01-02
Payer: COMMERCIAL

## 2025-01-02 DIAGNOSIS — M76.821 INSUFFICIENCY OF RIGHT POSTERIOR TIBIAL TENDON: Primary | ICD-10-CM

## 2025-01-02 DIAGNOSIS — M94.9 OSTEOCHONDRAL LESION OF TALAR DOME: ICD-10-CM

## 2025-01-02 DIAGNOSIS — M89.9 OSTEOCHONDRAL LESION OF TALAR DOME: ICD-10-CM

## 2025-01-02 PROCEDURE — 99999 PR PBB SHADOW E&M-EST. PATIENT-LVL IV: CPT | Mod: PBBFAC,,, | Performed by: ORTHOPAEDIC SURGERY

## 2025-01-02 RX ORDER — EPINEPHRINE 0.3 MG/.3ML
INJECTION SUBCUTANEOUS
COMMUNITY

## 2025-01-02 RX ORDER — TIZANIDINE HYDROCHLORIDE 2 MG/1
CAPSULE, GELATIN COATED ORAL
COMMUNITY

## 2025-01-02 RX ORDER — METHOCARBAMOL 750 MG/1
TABLET, FILM COATED ORAL
COMMUNITY

## 2025-01-02 RX ORDER — GABAPENTIN 100 MG/1
CAPSULE ORAL
COMMUNITY
Start: 2023-09-29

## 2025-01-02 NOTE — PATIENT INSTRUCTIONS
Follow up as needed. Contact us at [phone] or send us a message through [employer] if you'd like to schedule surgery.     Your feedback means a lot to us! If you have a moment, please leave a review to help others learn about your experience. Thank you!

## 2025-07-15 ENCOUNTER — TELEPHONE (OUTPATIENT)
Dept: INTERNAL MEDICINE | Facility: CLINIC | Age: 60
End: 2025-07-15
Payer: COMMERCIAL

## 2025-07-15 NOTE — TELEPHONE ENCOUNTER
L/V 3/20/23    I spoke with the patient concerning her request for mammogram orders. The patient was informed that it is time for her annual exam with Dr Adorno. The patient was informed that she will need an appointment for future lab and Mammo orders. The patient decline scheduling an appointment with Dr Adorno for an annual exam. The patient stated that all she need is a mammogram order. The patient decline the appointment.

## 2025-07-15 NOTE — TELEPHONE ENCOUNTER
Copied from CRM #[id number]. Topic: General Inquiry - Patient Advice  >> Jul 15, 2025 10:37 AM Med Assistant Yvrose wrote:  Type:  Needing Return Call    Who Called:Pebbles  Needs Call Back For: Needing to get copy of mammo order  Would the patient rather a call back or a response via [facility]?  Call  Best Call Back Number: [phone]   Additional Information:

## 2025-08-22 ENCOUNTER — HOSPITAL ENCOUNTER (OUTPATIENT)
Dept: RADIOLOGY | Facility: HOSPITAL | Age: 60
Discharge: HOME | End: 2025-08-22
Attending: FAMILY MEDICINE
Payer: COMMERCIAL

## 2025-08-22 ENCOUNTER — OFFICE VISIT (OUTPATIENT)
Dept: FAMILY MEDICINE | Facility: CLINIC | Age: 60
End: 2025-08-22
Payer: COMMERCIAL

## 2025-08-22 ENCOUNTER — TELEPHONE (OUTPATIENT)
Dept: FAMILY MEDICINE | Facility: CLINIC | Age: 60
End: 2025-08-22
Payer: COMMERCIAL

## 2025-08-22 VITALS
TEMPERATURE: 97 F | DIASTOLIC BLOOD PRESSURE: 72 MMHG | SYSTOLIC BLOOD PRESSURE: 118 MMHG | HEIGHT: 70 IN | WEIGHT: 293 LBS | BODY MASS INDEX: 41.95 KG/M2

## 2025-08-22 DIAGNOSIS — R73.03 PREDIABETES: ICD-10-CM

## 2025-08-22 DIAGNOSIS — E66.01 MORBID OBESITY: ICD-10-CM

## 2025-08-22 DIAGNOSIS — J18.9 PNEUMONIA DUE TO INFECTIOUS ORGANISM, UNSPECIFIED LATERALITY, UNSPECIFIED PART OF LUNG: Primary | ICD-10-CM

## 2025-08-22 DIAGNOSIS — Z12.31 ENCOUNTER FOR SCREENING MAMMOGRAM FOR MALIGNANT NEOPLASM OF BREAST: ICD-10-CM

## 2025-08-22 LAB — GLUCOSE SERPL-MCNC: 84 MG/DL (ref 70–110)

## 2025-08-22 PROCEDURE — 77067 SCR MAMMO BI INCL CAD: CPT | Mod: TC

## 2025-08-22 PROCEDURE — 99999 PR PBB SHADOW E&M-EST. PATIENT-LVL IV: CPT | Mod: PBBFAC,,,

## 2025-08-22 PROCEDURE — 77063 BREAST TOMOSYNTHESIS BI: CPT | Mod: 26,,, | Performed by: RADIOLOGY

## 2025-08-22 PROCEDURE — 99213 OFFICE O/P EST LOW 20 MIN: CPT | Mod: S$GLB,,,

## 2025-08-22 PROCEDURE — 3078F DIAST BP <80 MM HG: CPT | Mod: CPTII,S$GLB,,

## 2025-08-22 PROCEDURE — 77067 SCR MAMMO BI INCL CAD: CPT | Mod: 26,,, | Performed by: RADIOLOGY

## 2025-08-22 PROCEDURE — 82962 GLUCOSE BLOOD TEST: CPT | Mod: S$GLB,,,

## 2025-08-22 PROCEDURE — 3074F SYST BP LT 130 MM HG: CPT | Mod: CPTII,S$GLB,,

## 2025-08-22 PROCEDURE — 3008F BODY MASS INDEX DOCD: CPT | Mod: CPTII,S$GLB,,

## 2025-08-22 RX ORDER — CEFDINIR 300 MG/1
600 CAPSULE ORAL
COMMUNITY
Start: 2025-08-18 | End: 2025-09-04

## 2025-08-22 RX ORDER — LEVALBUTEROL TARTRATE 45 UG/1
1 AEROSOL, METERED ORAL EVERY 4 HOURS PRN
COMMUNITY
Start: 2025-08-17 | End: 2025-09-04

## 2025-09-04 ENCOUNTER — HOSPITAL ENCOUNTER (OUTPATIENT)
Dept: RADIOLOGY | Facility: HOSPITAL | Age: 60
Discharge: HOME | End: 2025-09-04
Payer: COMMERCIAL

## 2025-09-04 DIAGNOSIS — J18.9 PNEUMONIA DUE TO INFECTIOUS ORGANISM, UNSPECIFIED LATERALITY, UNSPECIFIED PART OF LUNG: ICD-10-CM

## 2025-09-04 PROCEDURE — 71046 X-RAY EXAM CHEST 2 VIEWS: CPT | Mod: TC,PO

## 2025-09-04 PROCEDURE — 71046 X-RAY EXAM CHEST 2 VIEWS: CPT | Mod: 26,,, | Performed by: STUDENT IN AN ORGANIZED HEALTH CARE EDUCATION/TRAINING PROGRAM
